# Patient Record
Sex: FEMALE | Race: WHITE | Employment: FULL TIME | URBAN - METROPOLITAN AREA
[De-identification: names, ages, dates, MRNs, and addresses within clinical notes are randomized per-mention and may not be internally consistent; named-entity substitution may affect disease eponyms.]

---

## 2018-08-10 PROBLEM — Z34.02 ENCOUNTER FOR SUPERVISION OF NORMAL FIRST PREGNANCY IN SECOND TRIMESTER: Status: ACTIVE | Noted: 2018-08-10

## 2018-09-06 ENCOUNTER — HOSPITAL ENCOUNTER (OUTPATIENT)
Age: 33
Discharge: HOME | End: 2018-09-06
Attending: OBSTETRICS & GYNECOLOGY | Admitting: OBSTETRICS & GYNECOLOGY
Payer: COMMERCIAL

## 2018-09-06 VITALS
HEART RATE: 62 BPM | WEIGHT: 133 LBS | RESPIRATION RATE: 16 BRPM | HEIGHT: 60 IN | TEMPERATURE: 98.2 F | DIASTOLIC BLOOD PRESSURE: 73 MMHG | BODY MASS INDEX: 26.11 KG/M2 | SYSTOLIC BLOOD PRESSURE: 111 MMHG

## 2018-09-06 PROBLEM — Z3A.20 20 WEEKS GESTATION OF PREGNANCY: Status: ACTIVE | Noted: 2018-09-06

## 2018-09-06 PROCEDURE — 76817 TRANSVAGINAL US OBSTETRIC: CPT | Performed by: OBSTETRICS & GYNECOLOGY

## 2018-09-06 PROCEDURE — 76811 OB US DETAILED SNGL FETUS: CPT

## 2018-09-06 PROCEDURE — 76817 TRANSVAGINAL US OBSTETRIC: CPT

## 2018-09-06 PROCEDURE — 76811 OB US DETAILED SNGL FETUS: CPT | Performed by: OBSTETRICS & GYNECOLOGY

## 2018-09-06 PROCEDURE — 99242 OFF/OP CONSLTJ NEW/EST SF 20: CPT | Performed by: OBSTETRICS & GYNECOLOGY

## 2018-09-06 PROCEDURE — 99211 OFF/OP EST MAY X REQ PHY/QHP: CPT

## 2018-09-06 PROCEDURE — 84112 EVAL AMNIOTIC FLUID PROTEIN: CPT

## 2018-09-06 NOTE — H&P
negative  Respiratory: negative  Cardiovascular: negative  Gastrointestinal: negative  Genitourinary:negative  Integument/breast: negative  Hematologic/lymphatic: negative  Musculoskeletal:negative  Neurological: negative  Behavioral/Psych: negative  Endocrine: negative  Allergic/Immunologic: negative    PHYSICAL EXAM:    General appearance:  awake, alert, cooperative, no apparent distress, and appears stated age  Neurologic:  Awake, alert, oriented to name, place and time. Cranial nerves II-XII are grossly intact. Motor is 5 out of 5 bilaterally. Cerebellar finger to nose, heel to shin intact. Sensory is intact.   Babinski down going, Romberg negative, and gait is normal.  Lungs:  No increased work of breathing, good air exchange, clear to auscultation bilaterally, no crackles or wheezing  Heart:  Normal apical impulse, regular rate and rhythm, normal S1 and S2, no S3 or S4, and no murmur noted  Abdomen:  No scars, normal bowel sounds, soft, non-distended, non-tender, no masses palpated, no hepatosplenomegally  Fetal heart rate:  Baseline Heart Rate 161  Pelvis:  External Genitalia: General appearance; normal, Hair distribution; normal, Lesions absent  Cervix:    DILATION:  Cl cm  EFFACEMENT:   50%  STATION:  -3cm      Contraction frequency:  0 minutes  Membranes:  intact = amnisure neg  Speculum = no pooling      /73   Pulse 62   Temp 98.2 °F (36.8 °C) (Oral)   Resp 16   Ht 5' (1.524 m)   Wt 133 lb (60.3 kg)   LMP 04/17/2018   BMI 25.97 kg/m²     General Labs:    CBC:   Lab Results   Component Value Date    WBC 8.4 07/13/2018    WBC 12.2 02/22/2017    RBC 4.35 07/13/2018    HGB 13.6 07/13/2018    HCT 38.7 07/13/2018    MCV 89.0 07/13/2018    RDW 12.3 07/13/2018     07/13/2018     02/22/2017     CMP:    Lab Results   Component Value Date     02/22/2017    K 4.5 02/22/2017     02/22/2017    CO2 23 02/22/2017    BUN 11 02/22/2017    PROT 7.5 02/21/2017     U/A:  No components

## 2018-09-21 PROBLEM — Z3A.20 20 WEEKS GESTATION OF PREGNANCY: Status: RESOLVED | Noted: 2018-09-06 | Resolved: 2018-09-21

## 2018-11-11 PROBLEM — Z34.02 ENCOUNTER FOR SUPERVISION OF NORMAL FIRST PREGNANCY IN SECOND TRIMESTER: Status: RESOLVED | Noted: 2018-08-10 | Resolved: 2018-11-11

## 2018-11-11 PROBLEM — Z34.03 ENCOUNTER FOR SUPERVISION OF NORMAL FIRST PREGNANCY IN THIRD TRIMESTER: Status: ACTIVE | Noted: 2018-11-11

## 2018-11-11 PROBLEM — O09.513 ELDERLY PRIMIGRAVIDA IN THIRD TRIMESTER: Status: ACTIVE | Noted: 2018-11-11

## 2018-11-11 PROBLEM — O35.EXX0 ENCOUNTER FOR REPEAT ULTRASOUND OF FETAL PYELECTASIS IN SINGLETON PREGNANCY, ANTEPARTUM: Status: ACTIVE | Noted: 2018-11-11

## 2019-01-04 PROBLEM — O35.EXX0 ENCOUNTER FOR REPEAT ULTRASOUND OF FETAL PYELECTASIS IN SINGLETON PREGNANCY, ANTEPARTUM: Status: ACTIVE | Noted: 2019-01-04

## 2019-01-14 ENCOUNTER — HOSPITAL ENCOUNTER (OUTPATIENT)
Age: 34
Discharge: HOME | End: 2019-01-14
Attending: OBSTETRICS & GYNECOLOGY | Admitting: OBSTETRICS & GYNECOLOGY
Payer: COMMERCIAL

## 2019-01-14 VITALS
WEIGHT: 165 LBS | HEIGHT: 60 IN | DIASTOLIC BLOOD PRESSURE: 85 MMHG | BODY MASS INDEX: 32.39 KG/M2 | HEART RATE: 62 BPM | SYSTOLIC BLOOD PRESSURE: 130 MMHG | RESPIRATION RATE: 18 BRPM | TEMPERATURE: 97.7 F

## 2019-01-14 PROBLEM — Z3A.38 38 WEEKS GESTATION OF PREGNANCY: Status: ACTIVE | Noted: 2019-01-14

## 2019-01-14 PROCEDURE — 99212 OFFICE O/P EST SF 10 MIN: CPT

## 2019-01-14 PROCEDURE — 84112 EVAL AMNIOTIC FLUID PROTEIN: CPT

## 2019-01-14 PROCEDURE — 99213 OFFICE O/P EST LOW 20 MIN: CPT | Performed by: OBSTETRICS & GYNECOLOGY

## 2019-01-23 ENCOUNTER — HOSPITAL ENCOUNTER (INPATIENT)
Age: 34
LOS: 3 days | Discharge: HOME | End: 2019-01-26
Attending: OBSTETRICS & GYNECOLOGY | Admitting: OBSTETRICS & GYNECOLOGY
Payer: COMMERCIAL

## 2019-01-23 ENCOUNTER — ANESTHESIA EVENT (OUTPATIENT)
Dept: LABOR AND DELIVERY | Age: 34
End: 2019-01-23
Payer: COMMERCIAL

## 2019-01-23 ENCOUNTER — APPOINTMENT (OUTPATIENT)
Dept: LABOR AND DELIVERY | Age: 34
End: 2019-01-23
Payer: COMMERCIAL

## 2019-01-23 ENCOUNTER — ANESTHESIA (OUTPATIENT)
Dept: LABOR AND DELIVERY | Age: 34
End: 2019-01-23
Payer: COMMERCIAL

## 2019-01-23 DIAGNOSIS — Z3A.40 40 WEEKS GESTATION OF PREGNANCY: Primary | ICD-10-CM

## 2019-01-23 LAB
ABO/RH: NORMAL
AMPHETAMINE SCREEN, URINE: NOT DETECTED
ANTIBODY SCREEN: NORMAL
BARBITURATE SCREEN URINE: NOT DETECTED
BENZODIAZEPINE SCREEN, URINE: NOT DETECTED
CANNABINOID SCREEN URINE: NOT DETECTED
COCAINE METABOLITE SCREEN URINE: NOT DETECTED
HCT VFR BLD CALC: 33.6 % (ref 34–48)
HEMOGLOBIN: 11.5 G/DL (ref 11.5–15.5)
MCH RBC QN AUTO: 30.8 PG (ref 26–35)
MCHC RBC AUTO-ENTMCNC: 34.2 % (ref 32–34.5)
MCV RBC AUTO: 90.1 FL (ref 80–99.9)
METHADONE SCREEN, URINE: NOT DETECTED
OPIATE SCREEN URINE: NOT DETECTED
PDW BLD-RTO: 12.8 FL (ref 11.5–15)
PHENCYCLIDINE SCREEN URINE: NOT DETECTED
PLATELET # BLD: 322 E9/L (ref 130–450)
PMV BLD AUTO: 10.9 FL (ref 7–12)
PROPOXYPHENE SCREEN: NOT DETECTED
RBC # BLD: 3.73 E12/L (ref 3.5–5.5)
WBC # BLD: 13.3 E9/L (ref 4.5–11.5)

## 2019-01-23 PROCEDURE — 1220000001 HC SEMI PRIVATE L&D R&B

## 2019-01-23 PROCEDURE — 86900 BLOOD TYPING SEROLOGIC ABO: CPT

## 2019-01-23 PROCEDURE — 80307 DRUG TEST PRSMV CHEM ANLYZR: CPT

## 2019-01-23 PROCEDURE — 2580000003 HC RX 258: Performed by: OBSTETRICS & GYNECOLOGY

## 2019-01-23 PROCEDURE — 86850 RBC ANTIBODY SCREEN: CPT

## 2019-01-23 PROCEDURE — 85027 COMPLETE CBC AUTOMATED: CPT

## 2019-01-23 PROCEDURE — 86901 BLOOD TYPING SEROLOGIC RH(D): CPT

## 2019-01-23 PROCEDURE — 36415 COLL VENOUS BLD VENIPUNCTURE: CPT

## 2019-01-23 PROCEDURE — 6370000000 HC RX 637 (ALT 250 FOR IP): Performed by: OBSTETRICS & GYNECOLOGY

## 2019-01-23 RX ORDER — SODIUM CHLORIDE 0.9 % (FLUSH) 0.9 %
10 SYRINGE (ML) INJECTION PRN
Status: DISCONTINUED | OUTPATIENT
Start: 2019-01-23 | End: 2019-01-27 | Stop reason: HOSPADM

## 2019-01-23 RX ORDER — DOCUSATE SODIUM 100 MG/1
100 CAPSULE, LIQUID FILLED ORAL 2 TIMES DAILY
Status: DISCONTINUED | OUTPATIENT
Start: 2019-01-23 | End: 2019-01-27 | Stop reason: HOSPADM

## 2019-01-23 RX ORDER — SODIUM CHLORIDE, SODIUM LACTATE, POTASSIUM CHLORIDE, CALCIUM CHLORIDE 600; 310; 30; 20 MG/100ML; MG/100ML; MG/100ML; MG/100ML
INJECTION, SOLUTION INTRAVENOUS CONTINUOUS
Status: DISCONTINUED | OUTPATIENT
Start: 2019-01-23 | End: 2019-01-27 | Stop reason: HOSPADM

## 2019-01-23 RX ORDER — SODIUM CHLORIDE 0.9 % (FLUSH) 0.9 %
10 SYRINGE (ML) INJECTION EVERY 12 HOURS SCHEDULED
Status: DISCONTINUED | OUTPATIENT
Start: 2019-01-23 | End: 2019-01-27 | Stop reason: HOSPADM

## 2019-01-23 RX ORDER — ONDANSETRON 2 MG/ML
4 INJECTION INTRAMUSCULAR; INTRAVENOUS EVERY 6 HOURS PRN
Status: DISCONTINUED | OUTPATIENT
Start: 2019-01-23 | End: 2019-01-24 | Stop reason: HOSPADM

## 2019-01-23 RX ADMIN — SODIUM CHLORIDE, POTASSIUM CHLORIDE, SODIUM LACTATE AND CALCIUM CHLORIDE: 600; 310; 30; 20 INJECTION, SOLUTION INTRAVENOUS at 21:05

## 2019-01-23 RX ADMIN — DINOPROSTONE 10 MG: 10 INSERT VAGINAL at 21:30

## 2019-01-24 VITALS — SYSTOLIC BLOOD PRESSURE: 109 MMHG | OXYGEN SATURATION: 96 % | DIASTOLIC BLOOD PRESSURE: 51 MMHG

## 2019-01-24 PROCEDURE — 2500000003 HC RX 250 WO HCPCS: Performed by: NURSE ANESTHETIST, CERTIFIED REGISTERED

## 2019-01-24 PROCEDURE — 6360000002 HC RX W HCPCS: Performed by: OBSTETRICS & GYNECOLOGY

## 2019-01-24 PROCEDURE — 2580000003 HC RX 258: Performed by: OBSTETRICS & GYNECOLOGY

## 2019-01-24 PROCEDURE — 1220000000 HC SEMI PRIVATE OB R&B

## 2019-01-24 PROCEDURE — 2580000003 HC RX 258: Performed by: NURSE ANESTHETIST, CERTIFIED REGISTERED

## 2019-01-24 PROCEDURE — 3609079900 HC CESAREAN SECTION: Performed by: OBSTETRICS & GYNECOLOGY

## 2019-01-24 PROCEDURE — 6370000000 HC RX 637 (ALT 250 FOR IP): Performed by: OBSTETRICS & GYNECOLOGY

## 2019-01-24 PROCEDURE — 3700000000 HC ANESTHESIA ATTENDED CARE: Performed by: OBSTETRICS & GYNECOLOGY

## 2019-01-24 PROCEDURE — 6360000002 HC RX W HCPCS

## 2019-01-24 PROCEDURE — 3700000001 HC ADD 15 MINUTES (ANESTHESIA): Performed by: OBSTETRICS & GYNECOLOGY

## 2019-01-24 PROCEDURE — 6360000002 HC RX W HCPCS: Performed by: NURSE ANESTHETIST, CERTIFIED REGISTERED

## 2019-01-24 PROCEDURE — 2709999900 HC NON-CHARGEABLE SUPPLY: Performed by: OBSTETRICS & GYNECOLOGY

## 2019-01-24 PROCEDURE — 2500000003 HC RX 250 WO HCPCS: Performed by: ANESTHESIOLOGY

## 2019-01-24 PROCEDURE — 6360000002 HC RX W HCPCS: Performed by: ANESTHESIOLOGY

## 2019-01-24 PROCEDURE — 2500000003 HC RX 250 WO HCPCS

## 2019-01-24 PROCEDURE — 7100000000 HC PACU RECOVERY - FIRST 15 MIN: Performed by: OBSTETRICS & GYNECOLOGY

## 2019-01-24 PROCEDURE — 3700000025 ANESTHESIA EPIDURAL BLOCK: Performed by: ANESTHESIOLOGY

## 2019-01-24 PROCEDURE — 51701 INSERT BLADDER CATHETER: CPT

## 2019-01-24 PROCEDURE — 7100000001 HC PACU RECOVERY - ADDTL 15 MIN: Performed by: OBSTETRICS & GYNECOLOGY

## 2019-01-24 RX ORDER — OXYCODONE HYDROCHLORIDE AND ACETAMINOPHEN 5; 325 MG/1; MG/1
2 TABLET ORAL EVERY 4 HOURS PRN
Status: DISCONTINUED | OUTPATIENT
Start: 2019-01-24 | End: 2019-01-27 | Stop reason: HOSPADM

## 2019-01-24 RX ORDER — KETOROLAC TROMETHAMINE 30 MG/ML
30 INJECTION, SOLUTION INTRAMUSCULAR; INTRAVENOUS EVERY 6 HOURS
Status: DISPENSED | OUTPATIENT
Start: 2019-01-24 | End: 2019-01-26

## 2019-01-24 RX ORDER — CEFAZOLIN SODIUM 2 G/50ML
2 SOLUTION INTRAVENOUS ONCE
Status: COMPLETED | OUTPATIENT
Start: 2019-01-24 | End: 2019-01-24

## 2019-01-24 RX ORDER — LIDOCAINE HYDROCHLORIDE 10 MG/ML
INJECTION, SOLUTION INFILTRATION; PERINEURAL
Status: DISCONTINUED
Start: 2019-01-24 | End: 2019-01-24 | Stop reason: WASHOUT

## 2019-01-24 RX ORDER — LANOLIN 100 %
OINTMENT (GRAM) TOPICAL
Status: DISCONTINUED | OUTPATIENT
Start: 2019-01-24 | End: 2019-01-27 | Stop reason: HOSPADM

## 2019-01-24 RX ORDER — SODIUM CHLORIDE, SODIUM LACTATE, POTASSIUM CHLORIDE, CALCIUM CHLORIDE 600; 310; 30; 20 MG/100ML; MG/100ML; MG/100ML; MG/100ML
INJECTION, SOLUTION INTRAVENOUS CONTINUOUS PRN
Status: DISCONTINUED | OUTPATIENT
Start: 2019-01-24 | End: 2019-01-24 | Stop reason: SDUPTHER

## 2019-01-24 RX ORDER — BISACODYL 10 MG
10 SUPPOSITORY, RECTAL RECTAL PRN
Status: DISCONTINUED | OUTPATIENT
Start: 2019-01-24 | End: 2019-01-27 | Stop reason: HOSPADM

## 2019-01-24 RX ORDER — SODIUM CHLORIDE 0.9 % (FLUSH) 0.9 %
10 SYRINGE (ML) INJECTION EVERY 12 HOURS SCHEDULED
Status: DISCONTINUED | OUTPATIENT
Start: 2019-01-24 | End: 2019-01-27 | Stop reason: HOSPADM

## 2019-01-24 RX ORDER — KETOROLAC TROMETHAMINE 30 MG/ML
15 INJECTION, SOLUTION INTRAMUSCULAR; INTRAVENOUS EVERY 6 HOURS
Status: COMPLETED | OUTPATIENT
Start: 2019-01-24 | End: 2019-01-25

## 2019-01-24 RX ORDER — MORPHINE SULFATE 1 MG/ML
INJECTION, SOLUTION EPIDURAL; INTRATHECAL; INTRAVENOUS PRN
Status: DISCONTINUED | OUTPATIENT
Start: 2019-01-24 | End: 2019-01-24 | Stop reason: SDUPTHER

## 2019-01-24 RX ORDER — ONDANSETRON 2 MG/ML
4 INJECTION INTRAMUSCULAR; INTRAVENOUS EVERY 6 HOURS PRN
Status: DISCONTINUED | OUTPATIENT
Start: 2019-01-24 | End: 2019-01-24 | Stop reason: HOSPADM

## 2019-01-24 RX ORDER — LIDOCAINE HYDROCHLORIDE AND EPINEPHRINE 20; 5 MG/ML; UG/ML
INJECTION, SOLUTION EPIDURAL; INFILTRATION; INTRACAUDAL; PERINEURAL PRN
Status: DISCONTINUED | OUTPATIENT
Start: 2019-01-24 | End: 2019-01-24 | Stop reason: SDUPTHER

## 2019-01-24 RX ORDER — NALOXONE HYDROCHLORIDE 0.4 MG/ML
0.4 INJECTION, SOLUTION INTRAMUSCULAR; INTRAVENOUS; SUBCUTANEOUS PRN
Status: DISCONTINUED | OUTPATIENT
Start: 2019-01-24 | End: 2019-01-24 | Stop reason: HOSPADM

## 2019-01-24 RX ORDER — ACETAMINOPHEN 650 MG
TABLET, EXTENDED RELEASE ORAL
Status: DISCONTINUED
Start: 2019-01-24 | End: 2019-01-24 | Stop reason: WASHOUT

## 2019-01-24 RX ORDER — TRISODIUM CITRATE DIHYDRATE AND CITRIC ACID MONOHYDRATE 500; 334 MG/5ML; MG/5ML
SOLUTION ORAL
Status: DISCONTINUED
Start: 2019-01-24 | End: 2019-01-24

## 2019-01-24 RX ORDER — EPHEDRINE SULFATE 50 MG/ML
10 INJECTION, SOLUTION INTRAVENOUS PRN
Status: DISCONTINUED | OUTPATIENT
Start: 2019-01-24 | End: 2019-01-24

## 2019-01-24 RX ORDER — TRISODIUM CITRATE DIHYDRATE AND CITRIC ACID MONOHYDRATE 500; 334 MG/5ML; MG/5ML
30 SOLUTION ORAL ONCE
Status: COMPLETED | OUTPATIENT
Start: 2019-01-24 | End: 2019-01-24

## 2019-01-24 RX ORDER — IBUPROFEN 800 MG/1
800 TABLET ORAL EVERY 8 HOURS
Status: DISCONTINUED | OUTPATIENT
Start: 2019-01-24 | End: 2019-01-27 | Stop reason: HOSPADM

## 2019-01-24 RX ORDER — DOCUSATE SODIUM 100 MG/1
100 CAPSULE, LIQUID FILLED ORAL 2 TIMES DAILY
Status: DISCONTINUED | OUTPATIENT
Start: 2019-01-24 | End: 2019-01-27 | Stop reason: HOSPADM

## 2019-01-24 RX ORDER — EPHEDRINE SULFATE 50 MG/ML
INJECTION INTRAVENOUS
Status: COMPLETED
Start: 2019-01-24 | End: 2019-01-24

## 2019-01-24 RX ORDER — OXYCODONE HYDROCHLORIDE AND ACETAMINOPHEN 5; 325 MG/1; MG/1
1 TABLET ORAL EVERY 4 HOURS PRN
Status: DISCONTINUED | OUTPATIENT
Start: 2019-01-24 | End: 2019-01-27 | Stop reason: HOSPADM

## 2019-01-24 RX ORDER — PROMETHAZINE HYDROCHLORIDE 25 MG/ML
INJECTION, SOLUTION INTRAMUSCULAR; INTRAVENOUS PRN
Status: DISCONTINUED | OUTPATIENT
Start: 2019-01-24 | End: 2019-01-24 | Stop reason: SDUPTHER

## 2019-01-24 RX ORDER — SODIUM CHLORIDE, SODIUM LACTATE, POTASSIUM CHLORIDE, CALCIUM CHLORIDE 600; 310; 30; 20 MG/100ML; MG/100ML; MG/100ML; MG/100ML
INJECTION, SOLUTION INTRAVENOUS CONTINUOUS
Status: DISCONTINUED | OUTPATIENT
Start: 2019-01-24 | End: 2019-01-27 | Stop reason: HOSPADM

## 2019-01-24 RX ORDER — SODIUM CHLORIDE 0.9 % (FLUSH) 0.9 %
10 SYRINGE (ML) INJECTION PRN
Status: DISCONTINUED | OUTPATIENT
Start: 2019-01-24 | End: 2019-01-27 | Stop reason: HOSPADM

## 2019-01-24 RX ORDER — DIPHENHYDRAMINE HYDROCHLORIDE 50 MG/ML
25 INJECTION INTRAMUSCULAR; INTRAVENOUS EVERY 6 HOURS PRN
Status: DISCONTINUED | OUTPATIENT
Start: 2019-01-24 | End: 2019-01-27 | Stop reason: HOSPADM

## 2019-01-24 RX ORDER — ONDANSETRON 2 MG/ML
4 INJECTION INTRAMUSCULAR; INTRAVENOUS EVERY 6 HOURS PRN
Status: DISCONTINUED | OUTPATIENT
Start: 2019-01-24 | End: 2019-01-27 | Stop reason: HOSPADM

## 2019-01-24 RX ORDER — ACETAMINOPHEN 325 MG/1
650 TABLET ORAL EVERY 4 HOURS PRN
Status: DISCONTINUED | OUTPATIENT
Start: 2019-01-24 | End: 2019-01-27 | Stop reason: HOSPADM

## 2019-01-24 RX ORDER — SIMETHICONE 80 MG
80 TABLET,CHEWABLE ORAL 4 TIMES DAILY
Status: DISCONTINUED | OUTPATIENT
Start: 2019-01-24 | End: 2019-01-27 | Stop reason: HOSPADM

## 2019-01-24 RX ORDER — CEFAZOLIN SODIUM 2 G/50ML
SOLUTION INTRAVENOUS
Status: COMPLETED
Start: 2019-01-24 | End: 2019-01-24

## 2019-01-24 RX ORDER — NALBUPHINE HCL 10 MG/ML
5 AMPUL (ML) INJECTION EVERY 4 HOURS PRN
Status: DISCONTINUED | OUTPATIENT
Start: 2019-01-24 | End: 2019-01-24 | Stop reason: HOSPADM

## 2019-01-24 RX ORDER — NALOXONE HYDROCHLORIDE 0.4 MG/ML
0.4 INJECTION, SOLUTION INTRAMUSCULAR; INTRAVENOUS; SUBCUTANEOUS PRN
Status: DISCONTINUED | OUTPATIENT
Start: 2019-01-24 | End: 2019-01-27 | Stop reason: HOSPADM

## 2019-01-24 RX ORDER — FERROUS SULFATE 325(65) MG
325 TABLET ORAL 2 TIMES DAILY WITH MEALS
Status: DISCONTINUED | OUTPATIENT
Start: 2019-01-25 | End: 2019-01-27 | Stop reason: HOSPADM

## 2019-01-24 RX ORDER — FENTANYL CITRATE 50 UG/ML
INJECTION, SOLUTION INTRAMUSCULAR; INTRAVENOUS PRN
Status: DISCONTINUED | OUTPATIENT
Start: 2019-01-24 | End: 2019-01-24 | Stop reason: SDUPTHER

## 2019-01-24 RX ORDER — PRENATAL WITH FERROUS FUM AND FOLIC ACID 3080; 920; 120; 400; 22; 1.84; 3; 20; 10; 1; 12; 200; 27; 25; 2 [IU]/1; [IU]/1; MG/1; [IU]/1; MG/1; MG/1; MG/1; MG/1; MG/1; MG/1; UG/1; MG/1; MG/1; MG/1; MG/1
1 TABLET ORAL DAILY
Status: DISCONTINUED | OUTPATIENT
Start: 2019-01-25 | End: 2019-01-27 | Stop reason: HOSPADM

## 2019-01-24 RX ORDER — NALBUPHINE HCL 10 MG/ML
5 AMPUL (ML) INJECTION EVERY 4 HOURS PRN
Status: DISCONTINUED | OUTPATIENT
Start: 2019-01-24 | End: 2019-01-27 | Stop reason: HOSPADM

## 2019-01-24 RX ADMIN — PHENYLEPHRINE HYDROCHLORIDE 100 MCG: 10 INJECTION INTRAVENOUS at 18:07

## 2019-01-24 RX ADMIN — Medication 15 ML/HR: at 04:08

## 2019-01-24 RX ADMIN — SODIUM CHLORIDE, POTASSIUM CHLORIDE, SODIUM LACTATE AND CALCIUM CHLORIDE: 600; 310; 30; 20 INJECTION, SOLUTION INTRAVENOUS at 23:41

## 2019-01-24 RX ADMIN — FENTANYL CITRATE 66.66 MCG: 50 INJECTION, SOLUTION INTRAMUSCULAR; INTRAVENOUS at 18:00

## 2019-01-24 RX ADMIN — SODIUM CHLORIDE, POTASSIUM CHLORIDE, SODIUM LACTATE AND CALCIUM CHLORIDE: 600; 310; 30; 20 INJECTION, SOLUTION INTRAVENOUS at 23:06

## 2019-01-24 RX ADMIN — PHENYLEPHRINE HYDROCHLORIDE 200 MCG: 10 INJECTION INTRAVENOUS at 18:10

## 2019-01-24 RX ADMIN — Medication 1 MILLI-UNITS/MIN: at 02:41

## 2019-01-24 RX ADMIN — KETOROLAC TROMETHAMINE 15 MG: 30 INJECTION, SOLUTION INTRAMUSCULAR; INTRAVENOUS at 22:28

## 2019-01-24 RX ADMIN — SODIUM CHLORIDE, POTASSIUM CHLORIDE, SODIUM LACTATE AND CALCIUM CHLORIDE: 600; 310; 30; 20 INJECTION, SOLUTION INTRAVENOUS at 18:03

## 2019-01-24 RX ADMIN — CEFAZOLIN SODIUM 2 G: 2 SOLUTION INTRAVENOUS at 17:42

## 2019-01-24 RX ADMIN — DOCUSATE SODIUM 100 MG: 100 CAPSULE, LIQUID FILLED ORAL at 22:28

## 2019-01-24 RX ADMIN — Medication 999 ML/HR: at 18:15

## 2019-01-24 RX ADMIN — PHENYLEPHRINE HYDROCHLORIDE 200 MCG: 10 INJECTION INTRAVENOUS at 18:55

## 2019-01-24 RX ADMIN — Medication 7 ML: at 04:01

## 2019-01-24 RX ADMIN — Medication 15 ML/HR: at 14:36

## 2019-01-24 RX ADMIN — PROMETHAZINE HYDROCHLORIDE 10 MG: 25 INJECTION INTRAMUSCULAR; INTRAVENOUS at 18:46

## 2019-01-24 RX ADMIN — ONDANSETRON 4 MG: 2 INJECTION INTRAMUSCULAR; INTRAVENOUS at 07:57

## 2019-01-24 RX ADMIN — LIDOCAINE HYDROCHLORIDE,EPINEPHRINE BITARTRATE 10 ML: 20; .005 INJECTION, SOLUTION EPIDURAL; INFILTRATION; INTRACAUDAL; PERINEURAL at 18:00

## 2019-01-24 RX ADMIN — SODIUM CITRATE AND CITRIC ACID MONOHYDRATE 30 ML: 500; 334 SOLUTION ORAL at 17:44

## 2019-01-24 RX ADMIN — PHENYLEPHRINE HYDROCHLORIDE 200 MCG: 10 INJECTION INTRAVENOUS at 18:39

## 2019-01-24 RX ADMIN — ONDANSETRON 4 MG: 2 INJECTION INTRAMUSCULAR; INTRAVENOUS at 16:03

## 2019-01-24 RX ADMIN — SODIUM CHLORIDE, POTASSIUM CHLORIDE, SODIUM LACTATE AND CALCIUM CHLORIDE: 600; 310; 30; 20 INJECTION, SOLUTION INTRAVENOUS at 17:41

## 2019-01-24 RX ADMIN — MORPHINE SULFATE 2 MG: 1 INJECTION EPIDURAL; INTRATHECAL; INTRAVENOUS at 18:23

## 2019-01-24 RX ADMIN — EPHEDRINE SULFATE 5 MG: 50 INJECTION, SOLUTION INTRAVENOUS at 05:08

## 2019-01-24 RX ADMIN — PHENYLEPHRINE HYDROCHLORIDE 100 MCG: 10 INJECTION INTRAVENOUS at 04:05

## 2019-01-24 ASSESSMENT — PULMONARY FUNCTION TESTS
PIF_VALUE: 0

## 2019-01-24 ASSESSMENT — PAIN SCALES - GENERAL: PAINLEVEL_OUTOF10: 5

## 2019-01-25 LAB
HCT VFR BLD CALC: 31.6 % (ref 34–48)
HEMOGLOBIN: 10.6 G/DL (ref 11.5–15.5)

## 2019-01-25 PROCEDURE — 6370000000 HC RX 637 (ALT 250 FOR IP): Performed by: OBSTETRICS & GYNECOLOGY

## 2019-01-25 PROCEDURE — 2580000003 HC RX 258: Performed by: OBSTETRICS & GYNECOLOGY

## 2019-01-25 PROCEDURE — 85018 HEMOGLOBIN: CPT

## 2019-01-25 PROCEDURE — 85014 HEMATOCRIT: CPT

## 2019-01-25 PROCEDURE — 6360000002 HC RX W HCPCS: Performed by: ANESTHESIOLOGY

## 2019-01-25 PROCEDURE — 36415 COLL VENOUS BLD VENIPUNCTURE: CPT

## 2019-01-25 PROCEDURE — 6360000002 HC RX W HCPCS: Performed by: OBSTETRICS & GYNECOLOGY

## 2019-01-25 PROCEDURE — 1220000000 HC SEMI PRIVATE OB R&B

## 2019-01-25 RX ORDER — IBUPROFEN 800 MG/1
800 TABLET ORAL EVERY 8 HOURS PRN
Qty: 30 TABLET | Refills: 0 | Status: ON HOLD | OUTPATIENT
Start: 2019-01-25 | End: 2021-07-20 | Stop reason: HOSPADM

## 2019-01-25 RX ORDER — OXYCODONE HYDROCHLORIDE AND ACETAMINOPHEN 5; 325 MG/1; MG/1
1 TABLET ORAL EVERY 6 HOURS PRN
Qty: 28 TABLET | Refills: 0 | Status: SHIPPED | OUTPATIENT
Start: 2019-01-25 | End: 2019-01-30

## 2019-01-25 RX ADMIN — SIMETHICONE 80 MG: 80 TABLET, CHEWABLE ORAL at 18:01

## 2019-01-25 RX ADMIN — KETOROLAC TROMETHAMINE 15 MG: 30 INJECTION, SOLUTION INTRAMUSCULAR; INTRAVENOUS at 04:35

## 2019-01-25 RX ADMIN — DOCUSATE SODIUM 100 MG: 100 CAPSULE, LIQUID FILLED ORAL at 20:55

## 2019-01-25 RX ADMIN — Medication 1 TABLET: at 11:24

## 2019-01-25 RX ADMIN — DOCUSATE SODIUM 100 MG: 100 CAPSULE, LIQUID FILLED ORAL at 11:24

## 2019-01-25 RX ADMIN — Medication 10 ML: at 11:25

## 2019-01-25 RX ADMIN — SIMETHICONE 80 MG: 80 TABLET, CHEWABLE ORAL at 11:24

## 2019-01-25 RX ADMIN — Medication 10 ML: at 18:01

## 2019-01-25 RX ADMIN — KETOROLAC TROMETHAMINE 15 MG: 30 INJECTION, SOLUTION INTRAMUSCULAR; INTRAVENOUS at 11:25

## 2019-01-25 RX ADMIN — SIMETHICONE 80 MG: 80 TABLET, CHEWABLE ORAL at 21:51

## 2019-01-25 RX ADMIN — KETOROLAC TROMETHAMINE 30 MG: 30 INJECTION, SOLUTION INTRAMUSCULAR; INTRAVENOUS at 18:01

## 2019-01-25 ASSESSMENT — PAIN DESCRIPTION - PROGRESSION
CLINICAL_PROGRESSION: RESOLVED
CLINICAL_PROGRESSION: GRADUALLY WORSENING
CLINICAL_PROGRESSION: GRADUALLY WORSENING
CLINICAL_PROGRESSION: RESOLVED

## 2019-01-25 ASSESSMENT — PAIN DESCRIPTION - RADICULAR PAIN
RADICULAR_PAIN: ABSENT
RADICULAR_PAIN: ABSENT

## 2019-01-25 ASSESSMENT — PAIN DESCRIPTION - LOCATION
LOCATION: INCISION
LOCATION: INCISION

## 2019-01-25 ASSESSMENT — PAIN DESCRIPTION - DESCRIPTORS
DESCRIPTORS: DISCOMFORT
DESCRIPTORS: DISCOMFORT

## 2019-01-25 ASSESSMENT — PAIN SCALES - GENERAL
PAINLEVEL_OUTOF10: 0
PAINLEVEL_OUTOF10: 3
PAINLEVEL_OUTOF10: 3
PAINLEVEL_OUTOF10: 0
PAINLEVEL_OUTOF10: 3

## 2019-01-25 ASSESSMENT — PAIN DESCRIPTION - PAIN TYPE
TYPE: SURGICAL PAIN
TYPE: SURGICAL PAIN

## 2019-01-25 ASSESSMENT — PAIN - FUNCTIONAL ASSESSMENT
PAIN_FUNCTIONAL_ASSESSMENT: ACTIVITIES ARE NOT PREVENTED
PAIN_FUNCTIONAL_ASSESSMENT: ACTIVITIES ARE NOT PREVENTED

## 2019-01-25 ASSESSMENT — PAIN DESCRIPTION - ORIENTATION
ORIENTATION: LOWER
ORIENTATION: LOWER

## 2019-01-25 ASSESSMENT — PAIN DESCRIPTION - FREQUENCY
FREQUENCY: INTERMITTENT
FREQUENCY: INTERMITTENT

## 2019-01-26 VITALS
SYSTOLIC BLOOD PRESSURE: 134 MMHG | OXYGEN SATURATION: 98 % | HEIGHT: 60 IN | HEART RATE: 67 BPM | DIASTOLIC BLOOD PRESSURE: 84 MMHG | RESPIRATION RATE: 18 BRPM | WEIGHT: 173 LBS | TEMPERATURE: 98.8 F | BODY MASS INDEX: 33.96 KG/M2

## 2019-01-26 PROCEDURE — 6370000000 HC RX 637 (ALT 250 FOR IP): Performed by: OBSTETRICS & GYNECOLOGY

## 2019-01-26 RX ADMIN — Medication 1 TABLET: at 08:57

## 2019-01-26 RX ADMIN — DOCUSATE SODIUM 100 MG: 100 CAPSULE, LIQUID FILLED ORAL at 08:57

## 2019-01-26 RX ADMIN — IBUPROFEN 800 MG: 800 TABLET ORAL at 00:08

## 2019-01-26 RX ADMIN — SIMETHICONE 80 MG: 80 TABLET, CHEWABLE ORAL at 08:57

## 2019-01-26 RX ADMIN — BISACODYL 10 MG: 10 SUPPOSITORY RECTAL at 14:21

## 2019-01-26 RX ADMIN — SIMETHICONE 80 MG: 80 TABLET, CHEWABLE ORAL at 14:21

## 2019-01-26 RX ADMIN — IBUPROFEN 800 MG: 800 TABLET ORAL at 08:57

## 2019-01-26 ASSESSMENT — PAIN - FUNCTIONAL ASSESSMENT: PAIN_FUNCTIONAL_ASSESSMENT: ACTIVITIES ARE NOT PREVENTED

## 2019-01-26 ASSESSMENT — PAIN DESCRIPTION - PAIN TYPE: TYPE: SURGICAL PAIN

## 2019-01-26 ASSESSMENT — PAIN DESCRIPTION - FREQUENCY: FREQUENCY: INTERMITTENT

## 2019-01-26 ASSESSMENT — PAIN DESCRIPTION - LOCATION: LOCATION: ABDOMEN;INCISION

## 2019-01-26 ASSESSMENT — PAIN SCALES - GENERAL
PAINLEVEL_OUTOF10: 4
PAINLEVEL_OUTOF10: 4

## 2019-01-26 ASSESSMENT — PAIN DESCRIPTION - DESCRIPTORS: DESCRIPTORS: ACHING;DISCOMFORT;SORE

## 2019-01-26 ASSESSMENT — PAIN DESCRIPTION - ORIENTATION: ORIENTATION: LOWER

## 2020-03-25 PROBLEM — O35.EXX0 ENCOUNTER FOR REPEAT ULTRASOUND OF FETAL PYELECTASIS IN SINGLETON PREGNANCY, ANTEPARTUM: Status: RESOLVED | Noted: 2018-11-11 | Resolved: 2020-03-24

## 2020-12-21 PROBLEM — N92.6 IRREGULAR MENSES: Status: ACTIVE | Noted: 2020-12-21

## 2020-12-21 PROBLEM — R30.0 DYSURIA: Status: ACTIVE | Noted: 2020-12-21

## 2020-12-21 PROBLEM — N76.1 CHRONIC VAGINITIS: Status: ACTIVE | Noted: 2020-12-21

## 2021-01-18 PROBLEM — Z3A.38 38 WEEKS GESTATION OF PREGNANCY: Status: RESOLVED | Noted: 2019-01-14 | Resolved: 2021-01-18

## 2021-01-18 PROBLEM — Z3A.40 40 WEEKS GESTATION OF PREGNANCY: Status: RESOLVED | Noted: 2019-01-23 | Resolved: 2021-01-18

## 2021-01-18 PROBLEM — N92.6 IRREGULAR MENSES: Status: RESOLVED | Noted: 2020-12-21 | Resolved: 2021-01-18

## 2021-01-18 PROBLEM — O09.513 ELDERLY PRIMIGRAVIDA IN THIRD TRIMESTER: Status: RESOLVED | Noted: 2018-11-11 | Resolved: 2021-01-18

## 2021-01-18 PROBLEM — O09.521 ADVANCED MATERNAL AGE IN MULTIGRAVIDA, FIRST TRIMESTER: Status: ACTIVE | Noted: 2021-01-18

## 2021-01-18 PROBLEM — O34.219 HISTORY OF CESAREAN DELIVERY, CURRENTLY PREGNANT: Status: ACTIVE | Noted: 2021-01-18

## 2021-01-18 PROBLEM — Z34.03 ENCOUNTER FOR SUPERVISION OF NORMAL FIRST PREGNANCY IN THIRD TRIMESTER: Status: RESOLVED | Noted: 2018-11-11 | Resolved: 2021-01-18

## 2021-01-18 PROBLEM — O35.EXX0 ENCOUNTER FOR REPEAT ULTRASOUND OF FETAL PYELECTASIS IN SINGLETON PREGNANCY, ANTEPARTUM: Status: RESOLVED | Noted: 2019-01-04 | Resolved: 2021-01-18

## 2021-04-26 PROBLEM — Z86.16 PERSONAL HISTORY OF COVID-19: Status: ACTIVE | Noted: 2021-04-26

## 2021-07-18 ENCOUNTER — ANESTHESIA EVENT (OUTPATIENT)
Dept: LABOR AND DELIVERY | Age: 36
End: 2021-07-18
Payer: COMMERCIAL

## 2021-07-19 ENCOUNTER — HOSPITAL ENCOUNTER (INPATIENT)
Age: 36
LOS: 2 days | Discharge: HOME | End: 2021-07-21
Attending: OBSTETRICS & GYNECOLOGY | Admitting: OBSTETRICS & GYNECOLOGY
Payer: COMMERCIAL

## 2021-07-19 ENCOUNTER — ANESTHESIA (OUTPATIENT)
Dept: LABOR AND DELIVERY | Age: 36
End: 2021-07-19
Payer: COMMERCIAL

## 2021-07-19 VITALS — SYSTOLIC BLOOD PRESSURE: 99 MMHG | DIASTOLIC BLOOD PRESSURE: 55 MMHG | OXYGEN SATURATION: 94 %

## 2021-07-19 DIAGNOSIS — G89.18 ACUTE POSTOPERATIVE PAIN: Primary | ICD-10-CM

## 2021-07-19 PROBLEM — Z3A.38 38 WEEKS GESTATION OF PREGNANCY: Status: ACTIVE | Noted: 2021-07-19

## 2021-07-19 LAB
ABO/RH: NORMAL
AMPHETAMINE SCREEN, URINE: NOT DETECTED
ANTIBODY SCREEN: NORMAL
BARBITURATE SCREEN URINE: NOT DETECTED
BENZODIAZEPINE SCREEN, URINE: NOT DETECTED
CANNABINOID SCREEN URINE: NOT DETECTED
COCAINE METABOLITE SCREEN URINE: NOT DETECTED
FENTANYL SCREEN, URINE: NOT DETECTED
HCT VFR BLD CALC: 34.3 % (ref 34–48)
HEMOGLOBIN: 11.3 G/DL (ref 11.5–15.5)
Lab: NORMAL
MCH RBC QN AUTO: 29.4 PG (ref 26–35)
MCHC RBC AUTO-ENTMCNC: 32.9 % (ref 32–34.5)
MCV RBC AUTO: 89.3 FL (ref 80–99.9)
METHADONE SCREEN, URINE: NOT DETECTED
OPIATE SCREEN URINE: NOT DETECTED
OXYCODONE URINE: NOT DETECTED
PDW BLD-RTO: 13.2 FL (ref 11.5–15)
PHENCYCLIDINE SCREEN URINE: NOT DETECTED
PLATELET # BLD: 314 E9/L (ref 130–450)
PMV BLD AUTO: 10.2 FL (ref 7–12)
RBC # BLD: 3.84 E12/L (ref 3.5–5.5)
WBC # BLD: 10 E9/L (ref 4.5–11.5)

## 2021-07-19 PROCEDURE — 6370000000 HC RX 637 (ALT 250 FOR IP): Performed by: OBSTETRICS & GYNECOLOGY

## 2021-07-19 PROCEDURE — 3609079900 HC CESAREAN SECTION: Performed by: OBSTETRICS & GYNECOLOGY

## 2021-07-19 PROCEDURE — 36415 COLL VENOUS BLD VENIPUNCTURE: CPT

## 2021-07-19 PROCEDURE — 86901 BLOOD TYPING SEROLOGIC RH(D): CPT

## 2021-07-19 PROCEDURE — 7100000001 HC PACU RECOVERY - ADDTL 15 MIN: Performed by: OBSTETRICS & GYNECOLOGY

## 2021-07-19 PROCEDURE — 6360000002 HC RX W HCPCS: Performed by: OBSTETRICS & GYNECOLOGY

## 2021-07-19 PROCEDURE — 80307 DRUG TEST PRSMV CHEM ANLYZR: CPT

## 2021-07-19 PROCEDURE — 6370000000 HC RX 637 (ALT 250 FOR IP): Performed by: ANESTHESIOLOGY

## 2021-07-19 PROCEDURE — 6360000002 HC RX W HCPCS: Performed by: ANESTHESIOLOGY

## 2021-07-19 PROCEDURE — 7100000000 HC PACU RECOVERY - FIRST 15 MIN: Performed by: OBSTETRICS & GYNECOLOGY

## 2021-07-19 PROCEDURE — 2709999900 HC NON-CHARGEABLE SUPPLY: Performed by: OBSTETRICS & GYNECOLOGY

## 2021-07-19 PROCEDURE — 85027 COMPLETE CBC AUTOMATED: CPT

## 2021-07-19 PROCEDURE — 59514 CESAREAN DELIVERY ONLY: CPT | Performed by: OBSTETRICS & GYNECOLOGY

## 2021-07-19 PROCEDURE — 86900 BLOOD TYPING SEROLOGIC ABO: CPT

## 2021-07-19 PROCEDURE — 2580000003 HC RX 258: Performed by: OBSTETRICS & GYNECOLOGY

## 2021-07-19 PROCEDURE — 2500000003 HC RX 250 WO HCPCS

## 2021-07-19 PROCEDURE — 3700000000 HC ANESTHESIA ATTENDED CARE: Performed by: OBSTETRICS & GYNECOLOGY

## 2021-07-19 PROCEDURE — 3700000001 HC ADD 15 MINUTES (ANESTHESIA): Performed by: OBSTETRICS & GYNECOLOGY

## 2021-07-19 PROCEDURE — 6360000002 HC RX W HCPCS

## 2021-07-19 PROCEDURE — 1220000000 HC SEMI PRIVATE OB R&B

## 2021-07-19 PROCEDURE — 86850 RBC ANTIBODY SCREEN: CPT

## 2021-07-19 PROCEDURE — 88307 TISSUE EXAM BY PATHOLOGIST: CPT

## 2021-07-19 RX ORDER — FOLIC ACID 1 MG/1
1 TABLET ORAL DAILY
Status: DISCONTINUED | OUTPATIENT
Start: 2021-07-19 | End: 2021-07-21 | Stop reason: HOSPADM

## 2021-07-19 RX ORDER — ONDANSETRON 2 MG/ML
INJECTION INTRAMUSCULAR; INTRAVENOUS PRN
Status: DISCONTINUED | OUTPATIENT
Start: 2021-07-19 | End: 2021-07-19 | Stop reason: SDUPTHER

## 2021-07-19 RX ORDER — PRENATAL WITH FERROUS FUM AND FOLIC ACID 3080; 920; 120; 400; 22; 1.84; 3; 20; 10; 1; 12; 200; 27; 25; 2 [IU]/1; [IU]/1; MG/1; [IU]/1; MG/1; MG/1; MG/1; MG/1; MG/1; MG/1; UG/1; MG/1; MG/1; MG/1; MG/1
1 TABLET ORAL DAILY
Status: DISCONTINUED | OUTPATIENT
Start: 2021-07-19 | End: 2021-07-21 | Stop reason: HOSPADM

## 2021-07-19 RX ORDER — IBUPROFEN 800 MG/1
800 TABLET ORAL EVERY 8 HOURS
Status: DISCONTINUED | OUTPATIENT
Start: 2021-07-20 | End: 2021-07-21 | Stop reason: HOSPADM

## 2021-07-19 RX ORDER — FERROUS SULFATE 325(65) MG
325 TABLET ORAL 2 TIMES DAILY WITH MEALS
Status: DISCONTINUED | OUTPATIENT
Start: 2021-07-19 | End: 2021-07-21 | Stop reason: HOSPADM

## 2021-07-19 RX ORDER — KETOROLAC TROMETHAMINE 30 MG/ML
30 INJECTION, SOLUTION INTRAMUSCULAR; INTRAVENOUS EVERY 6 HOURS
Status: DISPENSED | OUTPATIENT
Start: 2021-07-19 | End: 2021-07-20

## 2021-07-19 RX ORDER — SIMETHICONE 80 MG
80 TABLET,CHEWABLE ORAL 4 TIMES DAILY
Status: DISCONTINUED | OUTPATIENT
Start: 2021-07-19 | End: 2021-07-21 | Stop reason: HOSPADM

## 2021-07-19 RX ORDER — DIPHENHYDRAMINE HYDROCHLORIDE 50 MG/ML
12.5 INJECTION INTRAMUSCULAR; INTRAVENOUS
Status: DISCONTINUED | OUTPATIENT
Start: 2021-07-19 | End: 2021-07-19 | Stop reason: HOSPADM

## 2021-07-19 RX ORDER — SODIUM CHLORIDE, SODIUM LACTATE, POTASSIUM CHLORIDE, CALCIUM CHLORIDE 600; 310; 30; 20 MG/100ML; MG/100ML; MG/100ML; MG/100ML
INJECTION, SOLUTION INTRAVENOUS CONTINUOUS
Status: DISCONTINUED | OUTPATIENT
Start: 2021-07-19 | End: 2021-07-21 | Stop reason: HOSPADM

## 2021-07-19 RX ORDER — NALOXONE HYDROCHLORIDE 0.4 MG/ML
0.4 INJECTION, SOLUTION INTRAMUSCULAR; INTRAVENOUS; SUBCUTANEOUS PRN
Status: DISCONTINUED | OUTPATIENT
Start: 2021-07-19 | End: 2021-07-21 | Stop reason: HOSPADM

## 2021-07-19 RX ORDER — PHENYLEPHRINE HCL IN 0.9% NACL 1 MG/10 ML
SYRINGE (ML) INTRAVENOUS PRN
Status: DISCONTINUED | OUTPATIENT
Start: 2021-07-19 | End: 2021-07-19 | Stop reason: SDUPTHER

## 2021-07-19 RX ORDER — MORPHINE SULFATE 1 MG/ML
INJECTION, SOLUTION EPIDURAL; INTRATHECAL; INTRAVENOUS PRN
Status: DISCONTINUED | OUTPATIENT
Start: 2021-07-19 | End: 2021-07-19 | Stop reason: SDUPTHER

## 2021-07-19 RX ORDER — KETOROLAC TROMETHAMINE 30 MG/ML
30 INJECTION, SOLUTION INTRAMUSCULAR; INTRAVENOUS EVERY 6 HOURS PRN
Status: DISPENSED | OUTPATIENT
Start: 2021-07-19 | End: 2021-07-20

## 2021-07-19 RX ORDER — MODIFIED LANOLIN
OINTMENT (GRAM) TOPICAL
Status: DISCONTINUED | OUTPATIENT
Start: 2021-07-19 | End: 2021-07-21 | Stop reason: HOSPADM

## 2021-07-19 RX ORDER — BUPIVACAINE HYDROCHLORIDE 7.5 MG/ML
INJECTION, SOLUTION INTRASPINAL PRN
Status: DISCONTINUED | OUTPATIENT
Start: 2021-07-19 | End: 2021-07-19 | Stop reason: SDUPTHER

## 2021-07-19 RX ORDER — MORPHINE SULFATE 2 MG/ML
2 INJECTION, SOLUTION INTRAMUSCULAR; INTRAVENOUS EVERY 5 MIN PRN
Status: DISCONTINUED | OUTPATIENT
Start: 2021-07-19 | End: 2021-07-19 | Stop reason: HOSPADM

## 2021-07-19 RX ORDER — SODIUM CHLORIDE, SODIUM LACTATE, POTASSIUM CHLORIDE, AND CALCIUM CHLORIDE .6; .31; .03; .02 G/100ML; G/100ML; G/100ML; G/100ML
1000 INJECTION, SOLUTION INTRAVENOUS ONCE
Status: COMPLETED | OUTPATIENT
Start: 2021-07-19 | End: 2021-07-19

## 2021-07-19 RX ORDER — BISACODYL 10 MG
10 SUPPOSITORY, RECTAL RECTAL PRN
Status: DISCONTINUED | OUTPATIENT
Start: 2021-07-19 | End: 2021-07-21 | Stop reason: HOSPADM

## 2021-07-19 RX ORDER — TRISODIUM CITRATE DIHYDRATE AND CITRIC ACID MONOHYDRATE 500; 334 MG/5ML; MG/5ML
30 SOLUTION ORAL ONCE
Status: COMPLETED | OUTPATIENT
Start: 2021-07-19 | End: 2021-07-19

## 2021-07-19 RX ORDER — SODIUM CHLORIDE 9 MG/ML
25 INJECTION, SOLUTION INTRAVENOUS PRN
Status: DISCONTINUED | OUTPATIENT
Start: 2021-07-19 | End: 2021-07-21 | Stop reason: HOSPADM

## 2021-07-19 RX ORDER — ONDANSETRON 2 MG/ML
4 INJECTION INTRAMUSCULAR; INTRAVENOUS EVERY 6 HOURS PRN
Status: DISCONTINUED | OUTPATIENT
Start: 2021-07-19 | End: 2021-07-21 | Stop reason: HOSPADM

## 2021-07-19 RX ORDER — HYDROCODONE BITARTRATE AND ACETAMINOPHEN 5; 325 MG/1; MG/1
2 TABLET ORAL EVERY 4 HOURS PRN
Status: DISCONTINUED | OUTPATIENT
Start: 2021-07-19 | End: 2021-07-21 | Stop reason: HOSPADM

## 2021-07-19 RX ORDER — OXYCODONE HYDROCHLORIDE 5 MG/1
5 TABLET ORAL EVERY 4 HOURS PRN
Status: DISCONTINUED | OUTPATIENT
Start: 2021-07-19 | End: 2021-07-21 | Stop reason: HOSPADM

## 2021-07-19 RX ORDER — PROMETHAZINE HYDROCHLORIDE 25 MG/ML
6.25 INJECTION, SOLUTION INTRAMUSCULAR; INTRAVENOUS
Status: DISCONTINUED | OUTPATIENT
Start: 2021-07-19 | End: 2021-07-19 | Stop reason: HOSPADM

## 2021-07-19 RX ORDER — DIPHENHYDRAMINE HYDROCHLORIDE 50 MG/ML
12.5 INJECTION INTRAMUSCULAR; INTRAVENOUS EVERY 6 HOURS PRN
Status: DISCONTINUED | OUTPATIENT
Start: 2021-07-19 | End: 2021-07-21 | Stop reason: HOSPADM

## 2021-07-19 RX ORDER — FENTANYL CITRATE 50 UG/ML
25 INJECTION, SOLUTION INTRAMUSCULAR; INTRAVENOUS EVERY 5 MIN PRN
Status: DISCONTINUED | OUTPATIENT
Start: 2021-07-19 | End: 2021-07-19 | Stop reason: HOSPADM

## 2021-07-19 RX ORDER — SODIUM CHLORIDE 0.9 % (FLUSH) 0.9 %
5-40 SYRINGE (ML) INJECTION EVERY 12 HOURS SCHEDULED
Status: DISCONTINUED | OUTPATIENT
Start: 2021-07-19 | End: 2021-07-21 | Stop reason: HOSPADM

## 2021-07-19 RX ORDER — DOCUSATE SODIUM 100 MG/1
100 CAPSULE, LIQUID FILLED ORAL 2 TIMES DAILY
Status: DISCONTINUED | OUTPATIENT
Start: 2021-07-19 | End: 2021-07-21 | Stop reason: HOSPADM

## 2021-07-19 RX ORDER — ACETAMINOPHEN 325 MG/1
650 TABLET ORAL EVERY 4 HOURS
Status: DISCONTINUED | OUTPATIENT
Start: 2021-07-19 | End: 2021-07-21 | Stop reason: HOSPADM

## 2021-07-19 RX ORDER — 0.9 % SODIUM CHLORIDE 0.9 %
250 INTRAVENOUS SOLUTION INTRAVENOUS
Status: DISCONTINUED | OUTPATIENT
Start: 2021-07-19 | End: 2021-07-19 | Stop reason: HOSPADM

## 2021-07-19 RX ORDER — NALBUPHINE HCL 10 MG/ML
5 AMPUL (ML) INJECTION EVERY 4 HOURS PRN
Status: DISCONTINUED | OUTPATIENT
Start: 2021-07-19 | End: 2021-07-21 | Stop reason: HOSPADM

## 2021-07-19 RX ORDER — HYDROCODONE BITARTRATE AND ACETAMINOPHEN 5; 325 MG/1; MG/1
1 TABLET ORAL EVERY 4 HOURS PRN
Status: DISCONTINUED | OUTPATIENT
Start: 2021-07-19 | End: 2021-07-21 | Stop reason: HOSPADM

## 2021-07-19 RX ORDER — ACETAMINOPHEN 325 MG/1
650 TABLET ORAL EVERY 4 HOURS PRN
Status: DISCONTINUED | OUTPATIENT
Start: 2021-07-19 | End: 2021-07-21 | Stop reason: HOSPADM

## 2021-07-19 RX ORDER — SODIUM CHLORIDE 0.9 % (FLUSH) 0.9 %
5-40 SYRINGE (ML) INJECTION PRN
Status: DISCONTINUED | OUTPATIENT
Start: 2021-07-19 | End: 2021-07-21 | Stop reason: HOSPADM

## 2021-07-19 RX ORDER — MEPERIDINE HYDROCHLORIDE 25 MG/ML
12.5 INJECTION INTRAMUSCULAR; INTRAVENOUS; SUBCUTANEOUS EVERY 5 MIN PRN
Status: DISCONTINUED | OUTPATIENT
Start: 2021-07-19 | End: 2021-07-19 | Stop reason: HOSPADM

## 2021-07-19 RX ADMIN — SODIUM CHLORIDE, POTASSIUM CHLORIDE, SODIUM LACTATE AND CALCIUM CHLORIDE 1000 ML: 600; 310; 30; 20 INJECTION, SOLUTION INTRAVENOUS at 06:00

## 2021-07-19 RX ADMIN — Medication 100 MCG: at 07:43

## 2021-07-19 RX ADMIN — Medication 100 MCG: at 07:30

## 2021-07-19 RX ADMIN — Medication 909 ML/HR: at 07:33

## 2021-07-19 RX ADMIN — KETOROLAC TROMETHAMINE 30 MG: 30 INJECTION, SOLUTION INTRAMUSCULAR; INTRAVENOUS at 16:01

## 2021-07-19 RX ADMIN — Medication 2000 MG: at 07:07

## 2021-07-19 RX ADMIN — BUPIVACAINE HYDROCHLORIDE IN DEXTROSE 1.6 ML: 7.5 INJECTION, SOLUTION SUBARACHNOID at 07:20

## 2021-07-19 RX ADMIN — Medication 100 MCG: at 07:20

## 2021-07-19 RX ADMIN — Medication 100 MCG: at 07:42

## 2021-07-19 RX ADMIN — SIMETHICONE 80 MG: 80 TABLET, CHEWABLE ORAL at 20:54

## 2021-07-19 RX ADMIN — SODIUM CHLORIDE, PRESERVATIVE FREE 10 ML: 5 INJECTION INTRAVENOUS at 17:55

## 2021-07-19 RX ADMIN — SODIUM CHLORIDE, POTASSIUM CHLORIDE, SODIUM LACTATE AND CALCIUM CHLORIDE: 600; 310; 30; 20 INJECTION, SOLUTION INTRAVENOUS at 07:03

## 2021-07-19 RX ADMIN — KETOROLAC TROMETHAMINE 30 MG: 30 INJECTION, SOLUTION INTRAMUSCULAR; INTRAVENOUS at 20:54

## 2021-07-19 RX ADMIN — ACETAMINOPHEN 650 MG: 325 TABLET ORAL at 20:53

## 2021-07-19 RX ADMIN — Medication: at 13:00

## 2021-07-19 RX ADMIN — MORPHINE SULFATE 0.15 MG: 1 INJECTION, SOLUTION EPIDURAL; INTRATHECAL; INTRAVENOUS at 07:20

## 2021-07-19 RX ADMIN — ONDANSETRON 4 MG: 2 INJECTION INTRAMUSCULAR; INTRAVENOUS at 07:31

## 2021-07-19 RX ADMIN — SODIUM CHLORIDE, POTASSIUM CHLORIDE, SODIUM LACTATE AND CALCIUM CHLORIDE: 600; 310; 30; 20 INJECTION, SOLUTION INTRAVENOUS at 10:51

## 2021-07-19 RX ADMIN — Medication 100 MCG: at 07:45

## 2021-07-19 RX ADMIN — SODIUM CHLORIDE, PRESERVATIVE FREE 10 ML: 5 INJECTION INTRAVENOUS at 20:54

## 2021-07-19 RX ADMIN — DOCUSATE SODIUM 100 MG: 100 CAPSULE ORAL at 20:54

## 2021-07-19 RX ADMIN — SODIUM CHLORIDE, POTASSIUM CHLORIDE, SODIUM LACTATE AND CALCIUM CHLORIDE: 600; 310; 30; 20 INJECTION, SOLUTION INTRAVENOUS at 07:42

## 2021-07-19 RX ADMIN — Medication 100 MCG: at 07:25

## 2021-07-19 RX ADMIN — SODIUM CITRATE AND CITRIC ACID MONOHYDRATE 30 ML: 500; 334 SOLUTION ORAL at 06:52

## 2021-07-19 ASSESSMENT — PULMONARY FUNCTION TESTS
PIF_VALUE: 0
PIF_VALUE: 1
PIF_VALUE: 0
PIF_VALUE: 1
PIF_VALUE: 0
PIF_VALUE: 1
PIF_VALUE: 0

## 2021-07-19 ASSESSMENT — PAIN SCALES - GENERAL
PAINLEVEL_OUTOF10: 0
PAINLEVEL_OUTOF10: 1
PAINLEVEL_OUTOF10: 1
PAINLEVEL_OUTOF10: 3

## 2021-07-19 NOTE — ANESTHESIA PRE PROCEDURE
Department of Anesthesiology  Preprocedure Note       Name:  Cori Mitchell   Age:  38 y.o.  :  1985                                          MRN:  [id number]         Date:  2021      Surgeon: Ernesto Mayersor): Autumn Bañuelos MD    Procedure:  SECTION (N/A Uterus)    Medications prior to admission:   Prior to Admission medications    Medication Sig Start Date End Date Taking? Authorizing Provider   Prenatal-DSS-FeCb-FeGl-FA (CITRANATAL BLOOM) 90-1 MG TABS Take 1 tablet by mouth daily 21   Autumn Bañuelos MD   Prenatal Vit-Fe Fumarate-FA (PRENATAL PLUS) 27-1 MG TABS Take 1 tablet by mouth daily 21   Autumn Bañuelos MD   drospirenone-ethinyl estradiol 3-0.03 MG TABS Take 1 tablet by mouth daily  Patient not taking: Reported on 2021   Autumn Bañuelos MD   ibuprofen (ADVIL;MOTRIN) 800 MG tablet Take 1 tablet by mouth every 8 hours as needed for Pain  Patient not taking: Reported on 2021   Autumn Bañuelos MD   folic acid-pyridoxine-cyanocobalamine (FOLTX) 2.5-25-1 MG TABS tablet Take 1 tablet by mouth daily    Historical Provider, MD       Current medications:    No current facility-administered medications for this visit. No current outpatient medications on file.      Facility-Administered Medications Ordered in Other Visits   Medication Dose Route Frequency Provider Last Rate Last Admin    lactated ringers infusion   Intravenous Continuous Autumn Bañuelos MD        lactated ringers bolus  1,000 mL Intravenous Once Autumn Bañuelos MD 1,000 mL/hr at 21 0600 1,000 mL at 21 0600    citric acid-sodium citrate (BICITRA) solution 30 mL  30 mL Oral Once Autumn Bañuelos MD        ceFAZolin (ANCEF) 2000 mg in sterile water 20 mL IV syringe  2,000 mg Intravenous Once Autumn Bañuelos MD        oxytocin (PITOCIN) 30 units in 500 mL infusion Override Pull                Allergies:  No Known Allergies    Problem List:    Patient Active Problem List Tests: No results for input(s): POCGLU, POCNA, POCK, POCCL, POCBUN, POCHEMO, POCHCT in the last 72 hours. Coags: No results found for: PROTIME, INR, APTT    HCG (If Applicable):   Lab Results   Component Value Date    PREGTESTUR pos 12/21/2020        ABGs: No results found for: PHART, PO2ART, TSU6XTK, ATB6ZIC, BEART, E7BROTQM     Type & Screen (If Applicable):  Lab Results   Component Value Date    LABABO A 01/18/2021    [address] POSITIVE 01/18/2021       Anesthesia Evaluation  Patient summary reviewed and Nursing notes reviewed no history of anesthetic complications:   Airway: Mallampati: III  TM distance: <3 FB   Neck ROM: full  Mouth opening: > = 3 FB Dental: normal exam     Comment: Veneers; pt denied any missing, loose or chipped teeth     Pulmonary:Negative Pulmonary ROS breath sounds clear to auscultation                             Cardiovascular:Negative CV ROS            Rhythm: regular  Rate: normal           Beta Blocker:  Not on Beta Blocker         Neuro/Psych:   Negative Neuro/Psych ROS              GI/Hepatic/Renal: Neg GI/Hepatic/Renal ROS            Endo/Other: Negative Endo/Other ROS                    Abdominal:             Vascular: negative vascular ROS. Other Findings: pregnant              Anesthesia Plan      spinal and general     ASA 2     (Plan for spinal anesthesia with general as back up)  Induction: intravenous and rapid sequence. MIPS: Prophylactic antiemetics administered. Anesthetic plan and risks discussed with patient. Plan discussed with CRNA and attending.                   Morgan Mckeon RN   7/19/2021

## 2021-07-19 NOTE — PLAN OF CARE
Problem: Fluid Volume - Imbalance:  Goal: Absence of postpartum hemorrhage signs and symptoms  Description: Absence of postpartum hemorrhage signs and symptoms  Outcome: Met This Shift     Problem: Fluid Volume - Imbalance:  Goal: Absence of imbalanced fluid volume signs and symptoms  Description: Absence of imbalanced fluid volume signs and symptoms  Outcome: Met This Shift     Problem: Infection - Surgical Site:  Goal: Will show no infection signs and symptoms  Description: Will show no infection signs and symptoms  Outcome: Met This Shift     Problem: Mood - Altered:  Goal: Mood stable  Description: Mood stable  Outcome: Met This Shift     Problem: Nausea/Vomiting:  Goal: Absence of nausea/vomiting  Description: Absence of nausea/vomiting  Outcome: Ongoing     Problem: Pain - Acute:  Goal: Pain level will decrease  Description: Pain level will decrease  Outcome: Met This Shift     Problem: Urinary Retention:  Goal: Urinary elimination within specified parameters  Description: Urinary elimination within specified parameters  Outcome: Met This Shift     Problem: Venous Thromboembolism:  Goal: Will show no signs or symptoms of venous thromboembolism  Description: Will show no signs or symptoms of venous thromboembolism  Outcome: Met This Shift     Problem: Venous Thromboembolism:  Goal: Absence of signs or symptoms of impaired coagulation  Description: Absence of signs or symptoms of impaired coagulation  Outcome: Met This Shift

## 2021-07-19 NOTE — LACTATION NOTE
Baby in NN at this time. Pt states her first baby latched fine but her supply suffered, states she did give formula in hospital. Discouraged use of pacifier and formula at this time so baby places much demand on the breast providing pt with a rationale to this effect. Pt has no health history known that would influence poor supply. Encouraged skin to skin and frequent attempts at breast to stimulate milk production. Instructed on normal infant behavior in the first 12-24 hours and importance of stimulating the baby frequently to eat during this time. Reviewed hand expression, and encouraged to hand express drops of colostrum when baby is sleepy. Instructed that baby may also feed 8-12 times a day- cluster feeding at times- as her milk supply is being established. Instructed on benefits of skin to skin. Educated on making sure infant has an open airway while breastfeeding and skin to skin. Instructed on hunger cues and waking techniques to try. Reviewed signs of adequate I & O; allow baby to feed ad samina and not to limit time at breast. Information given regarding health benefits of colostrum and exclusive breastfeeding. Encouraged to call with any concerns. Patient requests Electronic breast pump for home use to increase breast milk supply.

## 2021-07-19 NOTE — OP NOTE
Operative Note      Patient: Lynsey Abernathy  YOB: 1985  MRN: [id number]    Date of Procedure: 2021    Pre-Op Diagnosis: csection    Post-Op Diagnosis: Same       Procedure(s):   SECTION    Surgeon(s): Layla Fallon MD    Assistant: Phillip Samayoa MD    Anesthesia: Spinal    Estimated Blood Loss (mL): 240     Complications: None    Specimens:   * No specimens in log *    Implants:  * No implants in log *      Drains:   Urethral Catheter Non-latex 16 fr (Active)       First Assistant: Phillip Samayoa MD    The use of a first assistant surgeon was necessary because there was no qualified resident surgeon available. Phillip Samayoa MD  assisted in the proper positioning, prepping, and draping of the patient, intraoperative retraction and suctioning for visualization, passing sutures and suture management.   Electronically signed by Phillip Samayoa MD on 2021 at 7:48 AM

## 2021-07-19 NOTE — OP NOTE
PREOPERATIVE DIAGNOSES:     1. 38w1d intrauterine pregnancy. 2.  advanced maternal age  1. Previous -patient declining TOLAC  4. Incisional pain      POSTOPERATIVE DIAGNOSES:     Same.      PROCEDURE: repeat low transverse  section        SURGEON: Huyen Wilcox MD MD FACOG       ASSISTANTKomal Gupta MD       ESTIMATED BLOOD LOSS:  283XI        COMPLICATIONS:  None.         ANESTHESIA:   Spinal anesthesia        FINDINGS:  Erica Van Tassell Born  Sex:  Female  Fetal Position:  Cephalic, occiput posterior  Apgars:  1 minute: 9; 5 minute: 9  Weight: pending  Tubes, uterus, ovaries:  Normal, dense lesions involving the bladder, very thin lower uterine segment with a peritoneal window.           DETAILS OF PROCEDURE:    The patient was taken to the operating room where Spinal anesthesia was administered and found to be adequate. Abdomen was prepped   and draped in the normal sterile fashion. Meier catheter had previously been   placed. Pfannenstiel skin incision made with a scalpel, carried down to the fascia with cautery. The fascia was nicked in the midline and extended laterally with   cautery. Muscles were  in the midline. Peritoneum was grasped with   hemostats, tented up, and entered sharply. Peritoneal incision was extended   superiorly and inferiorly with good visualization of bladder. Delee bladder blade was introduced. Bladder flap was created with sharp dissection. Dense adhesions noted involving the bladder but very thin lower uterine segment with the peritoneal window seen. Low transverse uterine incision was made with kerais clampsCharity Sandy and extended bluntly. Membranes ruptured for Clear fluid. A viable female infant was delivered in the cephalic presentation without diffiuclty. Baby was bulb suctioned on the abdomen. Cord was clamped and cut, and handed to waiting R.N. Cord blood was obtained. Placenta was manually extracted with 3 vessel cord.  Uterus was exteriorized, cleared of all clot and debris. Uterine incision   was closed with vicryl in a running locked fashion. Good hemostasis was   noted. Uterus, tubes, and ovaries appeared normal. Uterus was returned to   the pelvis. The pelvis was irrigated, cleared of all clot and debris. Fascia was closed with 0 stratafix  in a running fashion. Subcutaneous tissue was irrigated, made hemostatic. Skin was closed with absorbable subcutaneous sutures. Baby and mom to recovery room in stable condition. Placenta to pathology: Yes.     MD MD Nirmal Vergara Pollen

## 2021-07-19 NOTE — PROGRESS NOTES
Pt presents to unit for scheduled  section for advanced maternal age and SGA. IUP @ 38w1d. . CHAD: 2021. Pt denies any VB, LOF, or ctx's and states +FM and otherwise uneventful pregnancy. EFM applied with FHT's obtained. VSS. Call light within reach.

## 2021-07-19 NOTE — H&P
CHIEF COMPLAINT:  Here for repeat c section    HISTORY OF PRESENT ILLNESS:      The patient is a 38 y.o. female at 43w4d. OB History        2    Para   1    Term   1       0    AB   0    Living   1       SAB   0    TAB   0    Ectopic   0    Molar        Multiple   0    Live Births   1            Patient presents with a chief complaint as above and is being admitted for   without tubal ligation    Estimated Due Date: Estimated Date of Delivery: 21    PRENATAL CARE:    Complicated by: AMA    PAST OB HISTORY  OB History        2    Para   1    Term   1       0    AB   0    Living   1       SAB   0    TAB   0    Ectopic   0    Molar        Multiple   0    Live Births   1                Past Medical History:        Diagnosis Date     delivery delivered 2019     Past Surgical History:        Procedure Laterality Date    APPENDECTOMY       SECTION N/A 2019     SECTION performed [facility] Venus Block MD at [facility]    LAPAROSCOPIC APPENDECTOMY  2017     Allergies:  Patient has no known allergies. Social History:    Social History     Socioeconomic History    Marital status:      Spouse name: Not on file    Number of children: Not on file    Years of education: Not on file    Highest education level: Not on file   Occupational History    Not on file   Tobacco Use    Smoking status: Never Smoker    Smokeless tobacco: Never Used   Substance and Sexual Activity    Alcohol use: No    Drug use: No    Sexual activity: Yes     Partners: Male   Other Topics Concern    Not on file   Social History Narrative    Not on file     Social Determinants of Health     Financial Resource Strain:     Difficulty of Paying Living Expenses:    Food Insecurity:     Worried About Running Out of Food in the Last Year:     [address] in the Last Year:    Transportation Needs:     Lack of Transportation (Medical):      Lack of Transportation (Non-Medical):    Physical Activity:     Days of Exercise per Week:     Minutes of Exercise per Session:    Stress:     Feeling of Stress :    Social Connections:     Frequency of Communication with Friends and Family:     Frequency of Social Gatherings with Friends and Family:     Attends [employer] Services:     Active Member of Clubs or Organizations:     Attends Club or Organization Meetings:     Marital Status:    Intimate Partner Violence:     Fear of Current or Ex-Partner:     Emotionally Abused:     Physically Abused:     Sexually Abused:      Family History:   History reviewed. No pertinent family history. Medications Prior to Admission:  Medications Prior to Admission: Prenatal-DSS-FeCb-FeGl-FA (CITRANATAL BLOOM) 90-1 MG TABS, Take 1 tablet by mouth daily  folic acid-pyridoxine-cyanocobalamine (FOLTX) 2.5-25-1 MG TABS tablet, Take 1 tablet by mouth daily  Prenatal Vit-Fe Fumarate-FA (PRENATAL PLUS) 27-1 MG TABS, Take 1 tablet by mouth daily  drospirenone-ethinyl estradiol 3-0.03 MG TABS, Take 1 tablet by mouth daily (Patient not taking: Reported on 1/18/2021)  ibuprofen (ADVIL;MOTRIN) 800 MG tablet, Take 1 tablet by mouth every 8 hours as needed for Pain (Patient not taking: Reported on 1/18/2021)    REVIEW OF SYSTEMS:    CONSTITUTIONAL:  negative  RESPIRATORY:  negative  CARDIOVASCULAR:  negative  GASTROINTESTINAL:  negative  ALLERGIC/IMMUNOLOGIC:  negative  NEUROLOGICAL:  negative  BEHAVIOR/PSYCH:  negative    PHYSICAL EXAM:  Vitals:    07/19/21 0539 07/19/21 0600   BP: 121/76    Pulse: 71    Resp: 18    Temp: 98.6 °F (37 °C)    TempSrc: Oral    SpO2: 100%    Weight:  172 lb (78 kg)   Height:  5' (1.524 m)     General appearance:  awake, alert, cooperative, no apparent distress, and appears stated age  Neurologic:  Awake, alert, oriented to name, place and time.     Lungs:  No increased work of breathing, good air exchange  Abdomen:  Soft, non tender, gravid, consistent with her gestational age   Fetal heart rate:  Reassuring.   Pelvis:  Adequate pelvis  Cervix: Closed 50% medium -3  Contraction frequency:  0 minutes    Membranes:  Intact    ASSESSMENT AND PLAN:  IUP at term  Previous C section X 1  AMA    Labor: Admit,  routine labor orders  Fetus: Reassuring  GBS: Yes  Other: IV hydration and antiemetics, IV antibiotic therapy, , NPO after midnight, R, B, A and possible complications discussed

## 2021-07-19 NOTE — PROGRESS NOTES
Repeat LTCS delivery of viable baby girl at 0. Baby pink and active at delivery. Delayed cord clamping done. Baby brought to warmer for evaluation. APGARS 9/9.

## 2021-07-19 NOTE — ANESTHESIA PROCEDURE NOTES
Spinal Block    Patient location during procedure: OR  Reason for block: primary anesthetic  Staffing  Performed: other anesthesia staff   Anesthesiologist: Phi Tinsley MD  Resident/CRNA: TOBIAS Grimes - CRNA  Other anesthesia staff: Analisa Pa RN  Preanesthetic Checklist  Completed: patient identified, IV checked, site marked, risks and benefits discussed, surgical consent, monitors and equipment checked, pre-op evaluation, timeout performed, anesthesia consent given, oxygen available and patient being monitored  Spinal Block  Patient position: sitting  Prep: ChloraPrep  Patient monitoring: cardiac monitor, continuous pulse ox, continuous capnometry and frequent blood pressure checks  Approach: midline  Location: L3/L4  Provider prep: mask and sterile gloves  Local infiltration: lidocaine  Dose: 0.2  Agent: bupivacaine  Adjuvant: duramorph  Dose: 1.6  Dose: 1.6  Needle  Needle type: Pencan   Needle gauge: 22 G  Needle length: 3.5 in  Assessment  Sensory level: T4  Swirl obtained: Yes  CSF: clear  Attempts: 1  Hemodynamics: stable

## 2021-07-20 LAB
HCT VFR BLD CALC: 30.2 % (ref 34–48)
HEMOGLOBIN: 10 G/DL (ref 11.5–15.5)

## 2021-07-20 PROCEDURE — 1220000000 HC SEMI PRIVATE OB R&B

## 2021-07-20 PROCEDURE — 85014 HEMATOCRIT: CPT

## 2021-07-20 PROCEDURE — 2580000003 HC RX 258: Performed by: OBSTETRICS & GYNECOLOGY

## 2021-07-20 PROCEDURE — 85018 HEMOGLOBIN: CPT

## 2021-07-20 PROCEDURE — 36415 COLL VENOUS BLD VENIPUNCTURE: CPT

## 2021-07-20 PROCEDURE — 6370000000 HC RX 637 (ALT 250 FOR IP): Performed by: OBSTETRICS & GYNECOLOGY

## 2021-07-20 PROCEDURE — 6370000000 HC RX 637 (ALT 250 FOR IP): Performed by: ANESTHESIOLOGY

## 2021-07-20 PROCEDURE — 6360000002 HC RX W HCPCS: Performed by: ANESTHESIOLOGY

## 2021-07-20 RX ORDER — HYDROCODONE BITARTRATE AND ACETAMINOPHEN 5; 325 MG/1; MG/1
1 TABLET ORAL EVERY 6 HOURS PRN
Qty: 18 TABLET | Refills: 0 | Status: SHIPPED | OUTPATIENT
Start: 2021-07-20 | End: 2021-07-27

## 2021-07-20 RX ORDER — IBUPROFEN 800 MG/1
800 TABLET ORAL EVERY 8 HOURS
Qty: 30 TABLET | Refills: 0 | Status: SHIPPED | OUTPATIENT
Start: 2021-07-20 | End: 2022-05-19

## 2021-07-20 RX ADMIN — METFORMIN HYDROCHLORIDE 1 TABLET: 500 TABLET, EXTENDED RELEASE ORAL at 14:51

## 2021-07-20 RX ADMIN — ACETAMINOPHEN 650 MG: 325 TABLET ORAL at 23:39

## 2021-07-20 RX ADMIN — KETOROLAC TROMETHAMINE 30 MG: 30 INJECTION, SOLUTION INTRAMUSCULAR; INTRAVENOUS at 05:25

## 2021-07-20 RX ADMIN — SODIUM CHLORIDE, PRESERVATIVE FREE 10 ML: 5 INJECTION INTRAVENOUS at 05:24

## 2021-07-20 RX ADMIN — IBUPROFEN 800 MG: 800 TABLET, FILM COATED ORAL at 14:52

## 2021-07-20 RX ADMIN — FOLIC ACID 1 MG: 1 TABLET ORAL at 14:52

## 2021-07-20 RX ADMIN — DOCUSATE SODIUM 100 MG: 100 CAPSULE ORAL at 14:52

## 2021-07-20 RX ADMIN — SIMETHICONE 80 MG: 80 TABLET, CHEWABLE ORAL at 14:52

## 2021-07-20 RX ADMIN — IBUPROFEN 800 MG: 800 TABLET, FILM COATED ORAL at 22:48

## 2021-07-20 RX ADMIN — DOCUSATE SODIUM 100 MG: 100 CAPSULE ORAL at 19:57

## 2021-07-20 ASSESSMENT — PAIN DESCRIPTION - PROGRESSION: CLINICAL_PROGRESSION: GRADUALLY WORSENING

## 2021-07-20 ASSESSMENT — PAIN SCALES - GENERAL
PAINLEVEL_OUTOF10: 2
PAINLEVEL_OUTOF10: 3
PAINLEVEL_OUTOF10: 0
PAINLEVEL_OUTOF10: 3
PAINLEVEL_OUTOF10: 3

## 2021-07-20 ASSESSMENT — PAIN DESCRIPTION - FREQUENCY: FREQUENCY: INTERMITTENT

## 2021-07-20 ASSESSMENT — PAIN DESCRIPTION - PAIN TYPE: TYPE: SURGICAL PAIN

## 2021-07-20 ASSESSMENT — PAIN DESCRIPTION - DESCRIPTORS: DESCRIPTORS: SORE;DISCOMFORT;TENDER

## 2021-07-20 ASSESSMENT — PAIN - FUNCTIONAL ASSESSMENT
PAIN_FUNCTIONAL_ASSESSMENT: ACTIVITIES ARE NOT PREVENTED
PAIN_FUNCTIONAL_ASSESSMENT: 0-10

## 2021-07-20 ASSESSMENT — PAIN DESCRIPTION - ORIENTATION: ORIENTATION: LOWER

## 2021-07-20 ASSESSMENT — PAIN DESCRIPTION - LOCATION: LOCATION: ABDOMEN;INCISION

## 2021-07-20 NOTE — LACTATION NOTE
Mom reported breastfeeding is going well, a lot better than her first born. I reminded mom how to unlatch her baby from the breast and to use her own breast milk to prevent sore nipples. Encouraged her to call with breastfeeding questions. Observed mom latch baby on and feed for several minutes. Mom did not need any assistance.     [place], [address] [facility]

## 2021-07-20 NOTE — PROGRESS NOTES
[employer] [place] [employer] screening results were discussed with parent. Questions answered. Brochure given to parent. Advised to monitor developmental milestones and contact physician for any concerns.    Memory Jose

## 2021-07-20 NOTE — ANESTHESIA POSTPROCEDURE EVALUATION
Department of Anesthesiology  Postprocedure Note    Patient: Fidencio Marie  MRN: [id number]  YOB: 1985  Date of evaluation: 2021  Time:  2:41 PM     Procedure Summary     Date: 21 Room / Location: [facility] OR  [facility]    Anesthesia Start: 1112 Anesthesia Stop: 4317    Procedure:  SECTION (N/A Uterus) Diagnosis: (csection)    Surgeons: Rupesh Armendariz MD Responsible Provider: Ramon Franz MD    Anesthesia Type: spinal, general ASA Status: 2          Anesthesia Type: spinal, general    Jorden Phase I: Jorden Score: 9    Jorden Phase II:      Last vitals: Reviewed and per EMR flowsheets.        Anesthesia Post Evaluation    Patient location during evaluation: bedside  Patient participation: complete - patient participated  Level of consciousness: awake and alert  Pain score: 0  Airway patency: patent  Nausea & Vomiting: no nausea  Complications: no  Cardiovascular status: hemodynamically stable  Respiratory status: acceptable and room air  Hydration status: euvolemic

## 2021-07-20 NOTE — PROGRESS NOTES
Subjective:     Postpartum Day 1:  Delivery    The patient feels well. Pain is well controlled with current medications. Baby is feeding via breast. Urinary output is adequate. The patient is ambulating well. The patient is tolerating a normal diet. Flatus has been passed. Objective:        Vitals:    21 0718   BP: 113/63   Pulse: 63   Resp: 16   Temp: 98.1 °F (36.7 °C)   SpO2: 97%         Intake/Output Summary (Last 24 hours) at 2021 1446  Last data filed at 2021 1806  Gross per 24 hour   Intake 1150 ml   Output 550 ml   Net 600 ml     Lab Results   Component Value Date    WBC 10.0 2021    HGB 10.0 (L) 2021    HCT 30.2 (L) 2021    MCV 89.3 2021     2021       General:    alert, appears stated age and cooperative   Lungs: clear to auscultation bilaterally   Lochia:  appropriate   Uterine    firm   Incision:  healing well, no significant drainage, no dehiscence, no significant erythema   DVT Evaluation:  No evidence of DVT seen on physical exam.     Assessment:     Status post  section. Doing well postoperatively. Plan:     Continue current care.

## 2021-07-21 VITALS
TEMPERATURE: 98.7 F | BODY MASS INDEX: 33.77 KG/M2 | RESPIRATION RATE: 18 BRPM | HEIGHT: 60 IN | HEART RATE: 71 BPM | DIASTOLIC BLOOD PRESSURE: 68 MMHG | WEIGHT: 172 LBS | OXYGEN SATURATION: 98 % | SYSTOLIC BLOOD PRESSURE: 123 MMHG

## 2021-07-21 PROBLEM — G89.18 ACUTE POSTOPERATIVE PAIN: Status: ACTIVE | Noted: 2021-07-21

## 2021-07-21 PROCEDURE — 6370000000 HC RX 637 (ALT 250 FOR IP): Performed by: OBSTETRICS & GYNECOLOGY

## 2021-07-21 PROCEDURE — 6370000000 HC RX 637 (ALT 250 FOR IP): Performed by: ANESTHESIOLOGY

## 2021-07-21 RX ADMIN — IBUPROFEN 800 MG: 800 TABLET, FILM COATED ORAL at 06:58

## 2021-07-21 RX ADMIN — METFORMIN HYDROCHLORIDE 1 TABLET: 500 TABLET, EXTENDED RELEASE ORAL at 07:54

## 2021-07-21 RX ADMIN — ACETAMINOPHEN 650 MG: 325 TABLET ORAL at 07:54

## 2021-07-21 RX ADMIN — DOCUSATE SODIUM 100 MG: 100 CAPSULE ORAL at 07:54

## 2021-07-21 RX ADMIN — FOLIC ACID 1 MG: 1 TABLET ORAL at 07:54

## 2021-07-21 ASSESSMENT — PAIN SCALES - GENERAL
PAINLEVEL_OUTOF10: 1
PAINLEVEL_OUTOF10: 4

## 2021-07-21 ASSESSMENT — PAIN DESCRIPTION - PROGRESSION: CLINICAL_PROGRESSION: NOT CHANGED

## 2021-07-21 NOTE — FLOWSHEET NOTE
Discharge teaching given to pt and FOB re self and infant. All questions answered and both verbalized understanding.

## 2021-07-21 NOTE — PLAN OF CARE
Problem: Infection - Surgical Site:  Goal: Will show no infection signs and symptoms  Description: Will show no infection signs and symptoms  7/20/2021 2352 by Rena Mcgowan RN  Outcome: Met This Shift  7/20/2021 1805 by Victor Baumgarten, RN  Outcome: Ongoing     Problem: Nausea/Vomiting:  Goal: Absence of nausea/vomiting  Description: Absence of nausea/vomiting  7/20/2021 2352 by Rena Mcgowan RN  Outcome: Met This Shift  7/20/2021 1805 by Victor Baumgarten, RN  Outcome: Ongoing     Problem: Pain - Acute:  Goal: Pain level will decrease  Description: Pain level will decrease  7/20/2021 2352 by Rena Mcgowan RN  Outcome: Met This Shift  7/20/2021 1805 by Victor Baumgarten, RN  Outcome: Ongoing     Problem: Pain:  Goal: Pain level will decrease  Description: Pain level will decrease  7/20/2021 2352 by Rena Mcgowan RN  Outcome: Met This Shift  7/20/2021 1805 by Victor Baumgarten, RN  Outcome: Ongoing

## 2021-07-21 NOTE — LACTATION NOTE
Mom reports baby is nursing well, milk is coming in. Encouraged frequent feeds to establish milk supply. Reviewed benefits and safety of skin to skin. Inst on adequate I/O and importance of keeping track of diapers at home. Instructed on signs of dehydration such as infant refusing to feed, decreased wet diapers and infant becoming listless and notify provider if these occur. Reviewed with mom the importance of notifying the physician if baby looks more jaundiced. Lactation office # given if follow-up needed, as well as other helpful resources. Encouraged to call with any concerns. Support and encouragement given.

## 2021-07-21 NOTE — PROGRESS NOTES
Subjective:     Postpartum Day 2:  Delivery    The patient feels well. Pain is well controlled with current medications. Baby is feeding via breast. Urinary output is adequate. The patient is ambulating well. The patient is tolerating a normal diet. Flatus has been passed. Objective:        Vitals:    21 0809   BP: 123/68   Pulse: 71   Resp: 18   Temp: 98.7 °F (37.1 °C)   SpO2: 98%       No intake or output data in the 24 hours ending 21 0951  Lab Results   Component Value Date    WBC 10.0 2021    HGB 10.0 (L) 2021    HCT 30.2 (L) 2021    MCV 89.3 2021     2021       General:    alert, appears stated age and cooperative       Lochia:  appropriate   Uterine    firm   Incision:  dressing intact   DVT Evaluation:  No evidence of DVT seen on physical exam.     Assessment:     Status post  section. Doing well postoperatively. Plan:     Discharge home with standard precautions and return to clinic in 1 week.

## 2021-07-21 NOTE — PROGRESS NOTES
CLINICAL PHARMACY NOTE: MEDS TO BEDS    Total # of Prescriptions Filled: 2   The following medications were delivered to the patient:  · Norco 5-325 mg  ·  mg    Additional Documentation:

## 2021-07-23 NOTE — DISCHARGE SUMMARY
Obstetrical Discharge Form        Patient ID:  Jan Mccallum  [id number]  27 y.o.  1985    Admit date: 2021    Discharge date: 2021     Admitting Physician: Kristyn Huff MD    Discharge Diagnoses: 38 weeks gestation of pregnancy [Z3A.38]    Final Diagnosis:   Active Problems:    38 weeks gestation of pregnancy    Acute postoperative pain  Resolved Problems:    * No resolved hospital problems. *      Discharged Condition: good      Gestational Age:38w1d    Antepartum complications: AMA    Date of Delivery: 21    Type of Delivery:  for repeat    Delivered By: Enrrique Broderick MD         Baby:     Information for the patient's :  Sagar Tate [[id number]]          Anesthesia: Spinal    Intrapartum complications: None    Feeding method: breast    Hospital Course: Uneventful. Patient recovered well without any issues     Discharged Condition: stable to home    Discharge Medication:    Nadya Blankenship   Home Medication Instructions LZO:[id number]    Printed on:21 5869   Medication Information                      folic acid-pyridoxine-cyanocobalamine (FOLTX) 2.5-25-1 MG TABS tablet  Take 1 tablet by mouth daily             HYDROcodone-acetaminophen (NORCO) 5-325 MG per tablet  Take 1 tablet by mouth every 6 hours as needed for Pain for up to 7 days.              ibuprofen (ADVIL;MOTRIN) 800 MG tablet  Take 1 tablet by mouth every 8 hours                  Postpartum complications: none    Note that over 30 minutes was spent in preparing discharge papers, discussing discharge with patient, medication review, etc.    Discharge Date: 2021    Plan:   Follow up in 2 week(s)

## 2022-02-15 ENCOUNTER — OFFICE VISIT (OUTPATIENT)
Dept: ENDOCRINOLOGY | Age: 37
End: 2022-02-15
Payer: COMMERCIAL

## 2022-02-15 VITALS
HEART RATE: 62 BPM | BODY MASS INDEX: 30.43 KG/M2 | HEIGHT: 60 IN | OXYGEN SATURATION: 99 % | SYSTOLIC BLOOD PRESSURE: 127 MMHG | WEIGHT: 155 LBS | DIASTOLIC BLOOD PRESSURE: 85 MMHG

## 2022-02-15 DIAGNOSIS — E05.90 HYPERTHYROIDISM: ICD-10-CM

## 2022-02-15 DIAGNOSIS — E05.90 HYPERTHYROIDISM: Primary | ICD-10-CM

## 2022-02-15 DIAGNOSIS — E55.9 VITAMIN D DEFICIENCY: ICD-10-CM

## 2022-02-15 LAB
ANION GAP SERPL CALCULATED.3IONS-SCNC: 11 MMOL/L (ref 7–16)
BUN BLDV-MCNC: 25 MG/DL (ref 6–20)
CALCIUM SERPL-MCNC: 9.4 MG/DL (ref 8.6–10.2)
CHLORIDE BLD-SCNC: 101 MMOL/L (ref 98–107)
CO2: 26 MMOL/L (ref 22–29)
CREAT SERPL-MCNC: 0.9 MG/DL (ref 0.5–1)
GFR AFRICAN AMERICAN: >60
GFR NON-AFRICAN AMERICAN: >60 ML/MIN/1.73
GLUCOSE BLD-MCNC: 98 MG/DL (ref 74–99)
POTASSIUM SERPL-SCNC: 4.3 MMOL/L (ref 3.5–5)
SODIUM BLD-SCNC: 138 MMOL/L (ref 132–146)
T3 TOTAL: 76.32 NG/DL (ref 80–200)
T4 FREE: 0.5 NG/DL (ref 0.93–1.7)
T4 TOTAL: 4.1 MCG/DL (ref 4.5–11.7)
TSH SERPL DL<=0.05 MIU/L-ACNC: 25.17 UIU/ML (ref 0.27–4.2)
VITAMIN D 25-HYDROXY: 55 NG/ML (ref 30–100)

## 2022-02-15 PROCEDURE — 1036F TOBACCO NON-USER: CPT | Performed by: INTERNAL MEDICINE

## 2022-02-15 PROCEDURE — G8417 CALC BMI ABV UP PARAM F/U: HCPCS | Performed by: INTERNAL MEDICINE

## 2022-02-15 PROCEDURE — G8427 DOCREV CUR MEDS BY ELIG CLIN: HCPCS | Performed by: INTERNAL MEDICINE

## 2022-02-15 PROCEDURE — G8484 FLU IMMUNIZE NO ADMIN: HCPCS | Performed by: INTERNAL MEDICINE

## 2022-02-15 PROCEDURE — 99204 OFFICE O/P NEW MOD 45 MIN: CPT | Performed by: INTERNAL MEDICINE

## 2022-02-15 NOTE — PROGRESS NOTES
[address]  [facility] Department of Endocrinology Diabetes and Metabolism   [address] [facility] [address]   Phone: [phone]  Fax: [phone]    Date of Service: 2/15/2022  Primary Care Physician: Avila Carlin DO  Referring physician: Danita Sanford DO  Provider: Graeme Jenkins MD    Reason for the visit:  Hyperthyroidism    History of Present Illness: The history is provided by the patient. No  was used. Accuracy of the patient data is excellent. Ilia Wright is a very pleasant 43 y.o. female seen today for evaluation and management of hyperthyroidism     Ilia Wright was diagnosed with hyperthyroidism in 2021 few months after delivery. At that time she was c/o intermittent palpitations, change in appetite, nervousness, anxiety, irritability, tremor, muscle weakness and difficulty sleeping. Labs confirmed that she is thyrotoxic   Lab Results   Component Value Date/Time    TSH 0.01 (L) 2021 08:30 AM    T4FREE 2.23 (H) 2021 08:30 AM     Pt was never stared on any medication for thyroid     Now still c/o of symptoms but less often     Pt reports positive FH of hashimoto's thyroid disease in her Mother and MGM     PAST MEDICAL HISTORY   Past Medical History:   Diagnosis Date     delivery delivered 2019       PAST SURGICAL HISTORY   Past Surgical History:   Procedure Laterality Date    APPENDECTOMY       SECTION N/A 2019     SECTION performed [facility] Hughes MD at [facility] L&D     SECTION N/A 2021     SECTION performed by Daisha Hughes MD at [facility] L&D OR    LAPAROSCOPIC APPENDECTOMY  2017       SOCIAL HISTORY   Tobacco:   reports that she has never smoked. She has never used smokeless tobacco.  Alcohol:   reports no history of alcohol use. Drugs:   reports no history of drug use. FAMILY HISTORY   No family history on file.     ALLERGIES AND DRUG REACTIONS No Known Allergies    CURRENT MEDICATIONS   Current Outpatient Medications   Medication Sig Dispense Refill    Prenat-FeFmCb-DSS-FA-DHA w/o A (CITRANATAL HARMONY) 27-1-260 MG CAPS Take 1 capsule by mouth daily (Patient not taking: Reported on 10/25/2021) 30 capsule 11    ibuprofen (ADVIL;MOTRIN) 800 MG tablet Take 1 tablet by mouth every 8 hours (Patient not taking: Reported on 10/25/2021) 30 tablet 0    folic acid-pyridoxine-cyanocobalamine (FOLTX) 2.5-25-1 MG TABS tablet Take 1 tablet by mouth daily (Patient not taking: Reported on 10/25/2021)       Current Facility-Administered Medications   Medication Dose Route Frequency Provider Last Rate Last Admin    Paragard Intrauterine Copper IUD 1 each  1 each IntraUTERine Once Sari Short MD   1 each at 10/25/21 0916       Review of Systems  Constitutional: No fever, no chills, no diaphoresis, no generalized weakness. HEENT: No blurred vision, No sore throat, no ear pain, no hair loss  Neck: denied any neck swelling, difficulty swallowing,   Cadrdiopulomary: No CP, SOB or palpitation, No orthopnea or PND. No cough or wheezing. GI: No N/V/D, no constipation, No abdominal pain, no melena or hematochezia   : Denied any dysuria, hematuria, flank pain, discharge, or incontinence. Skin: denied any rash, ulcer, Hirsute, or hyperpigmentation. MSK: denied any joint deformity, joint pain/swelling, muscle pain, or back pain.   Neuro: no numbess, no tingling, no weakness,     OBJECTIVE    /85   Pulse 62   Ht 5' (1.524 m)   Wt 155 lb (70.3 kg)   SpO2 99%   BMI 30.27 kg/m²   BP Readings from Last 4 Encounters:   02/15/22 127/85   01/26/22 135/83   11/17/21 131/78   10/25/21 128/82     Wt Readings from Last 6 Encounters:   02/15/22 155 lb (70.3 kg)   01/26/22 156 lb (70.8 kg)   11/17/21 151 lb (68.5 kg)   10/25/21 159 lb (72.1 kg)   08/26/21 159 lb (72.1 kg)   07/22/21 166 lb (75.3 kg)       Physical examination:  General: awake alert, oriented x3, no abnormal position or movements. HEENT: normocephalic non traumatic, no exophthalmos, no lid lag, no lid retraction   Neck: supple, no LN enlargement, no thyromegaly, no thyroid tenderness, no thyroid bruit, no JVD. Pulm: Clear equal air entry no added sounds, no wheezing or rhonchi    CVS: S1 + S2, no murmur, no heave. Dorsalis pedis pulse palpable   Abd: soft lax, no tenderness, no organomegaly, audible bowel sounds. Skin: warm, no lesions, no rash.  No palmar erythema, no onycholysis,    Musculoskeletal: No back tenderness, no kyphosis/scoliosis    Neuro: CN intact, sensation notmal , muscle power normal. No tremors   Psych: normal mood, and affect    Review of Laboratory Data:  I have reviewed the following:  Lab Results   Component Value Date/Time    WBC 6.8 11/17/2021 08:30 AM    WBC 10.0 07/19/2021 05:50 AM    RBC 5.12 11/17/2021 08:30 AM    HGB 14.3 11/17/2021 08:30 AM    HCT 44.0 11/17/2021 08:30 AM    MCV 86 11/17/2021 08:30 AM    MCH 27.9 11/17/2021 08:30 AM    MCHC 32.4 11/17/2021 08:30 AM    RDW 12.5 11/17/2021 08:30 AM     11/17/2021 08:30 AM     07/19/2021 05:50 AM    MPV 9.1 11/17/2021 08:30 AM      Lab Results   Component Value Date/Time     02/22/2017 04:13 AM    K 4.5 02/22/2017 04:13 AM    CO2 23 02/22/2017 04:13 AM    BUN 11 02/22/2017 04:13 AM    CREATININE 0.8 02/22/2017 04:13 AM    CALCIUM 8.8 02/22/2017 04:13 AM    LABGLOM >60 02/22/2017 04:13 AM    GFRAA >60 02/22/2017 04:13 AM      Lab Results   Component Value Date/Time    TSH 0.01 (L) 11/17/2021 08:30 AM    T4FREE 2.23 (H) 11/17/2021 08:30 AM     Lab Results   Component Value Date    GLUCOSE 120 02/22/2017     No results found for: TRIG, HDL, LDLCALC, CHOL  No results found for: [address] [facility] [address], a 43 y.o.-old female seen in for management of following issues      Hyperthyroidism  · Post-partum thyroiditis, vs hashimoto's thyroiditis vs graves' disease vs toxic thyroid nodule(s)   · Today will check TSH, free T4, T4, T3, TSI, TPO-Ab, Tg-Ab  · Will also obtain thyroid US   · Will follow results and proceed accordingly     Bone health/vitD deficiency    · Discussed the effect of hyperthyroidism on bone health  · Continue vitD supplement   · Check vitD level today     I personally reviewed external notes from PCP and other patient's care team providers, and personally interpreted labs associated with the above diagnosis. I also ordered labs to further assess and manage the above addressed medical conditions. Return in about 4 months (around 6/15/2022) for Hyperthyroidism, VitD deficiency. The above issues were reviewed with the patient who understood and agreed with the plan. Thank you for allowing us to participate in the care of this patient. Please do not hesitate to contact us with any additional questions. Diagnosis Orders   1. Hyperthyroidism  TSH without Reflex    T4, Free    T4    T3    Thyroid AB    US THYROID    Thyroid Stimulating Immunoglobulin   2. Vitamin D deficiency  Vitamin D 25 Hydroxy    Basic Metabolic Panel     Guero Moulton MD  Endocrinologist, [facility] - BEHAVIORAL HEALTH SERVICES Diabetes Care and Endocrinology   [address] [facility] [address]   Phone: [phone]  Fax: [phone]  -------------------------  An electronic signature was used to authenticate this note.  Juan C Leung MD on 2/15/2022 at 9:12 AM

## 2022-02-17 ENCOUNTER — TELEPHONE (OUTPATIENT)
Dept: ENDOCRINOLOGY | Age: 37
End: 2022-02-17

## 2022-02-17 DIAGNOSIS — E05.90 HYPERTHYROIDISM: Primary | ICD-10-CM

## 2022-02-17 DIAGNOSIS — E03.9 HYPOTHYROIDISM, UNSPECIFIED TYPE: ICD-10-CM

## 2022-02-17 RX ORDER — LEVOTHYROXINE SODIUM 0.05 MG/1
50 TABLET ORAL DAILY
Qty: 30 TABLET | Refills: 5 | Status: SHIPPED | OUTPATIENT
Start: 2022-02-17 | End: 2022-03-14

## 2022-02-18 NOTE — TELEPHONE ENCOUNTER
Notify pt,  I have reviewed your recent results    Your thyroid hormones are very low and previously abnormal thyroid results likely due to thyroiditis     I strongly recommend starting Levothyroxine 50 mcg daily and recheck labs in 6-8 weeks    Please take levothyroxine in the morning at empty stomach, wait one hour before eating , avoid multivitamins containing calcium  or iron with it.

## 2022-02-19 ENCOUNTER — HOSPITAL ENCOUNTER (OUTPATIENT)
Dept: ULTRASOUND IMAGING | Age: 37
Discharge: HOME | End: 2022-02-21
Payer: COMMERCIAL

## 2022-02-19 DIAGNOSIS — E05.90 HYPERTHYROIDISM: ICD-10-CM

## 2022-02-19 LAB — THYROID STIMULATING IMMUNOGLOBULIN: <0.1 IU/L

## 2022-02-19 PROCEDURE — 76536 US EXAM OF HEAD AND NECK: CPT

## 2022-02-20 ENCOUNTER — TELEPHONE (OUTPATIENT)
Dept: ENDOCRINOLOGY | Age: 37
End: 2022-02-20

## 2022-02-20 NOTE — TELEPHONE ENCOUNTER
Notify pt,  I have reviewed your recent results    Excellent!  Thyroid US was negative for any thyroid nodules

## 2022-02-21 LAB
THYROGLOBULIN ANTIBODY: 2901 IU/ML (ref 0–40)
THYROID PEROXIDASE (TPO) ABS: 32 IU/ML (ref 0–25)

## 2022-03-12 DIAGNOSIS — E03.9 HYPOTHYROIDISM, UNSPECIFIED TYPE: ICD-10-CM

## 2022-03-14 RX ORDER — LEVOTHYROXINE SODIUM 0.05 MG/1
TABLET ORAL
Qty: 30 TABLET | Refills: 5 | Status: SHIPPED
Start: 2022-03-14 | End: 2022-04-14

## 2022-04-13 DIAGNOSIS — E03.9 HYPOTHYROIDISM, UNSPECIFIED TYPE: ICD-10-CM

## 2022-04-13 LAB
T4 FREE: 0.99 NG/DL (ref 0.93–1.7)
TSH SERPL DL<=0.05 MIU/L-ACNC: 6.89 UIU/ML (ref 0.27–4.2)

## 2022-04-14 ENCOUNTER — TELEPHONE (OUTPATIENT)
Dept: ENDOCRINOLOGY | Age: 37
End: 2022-04-14

## 2022-04-14 DIAGNOSIS — E03.9 HYPOTHYROIDISM, UNSPECIFIED TYPE: Primary | ICD-10-CM

## 2022-04-14 RX ORDER — LEVOTHYROXINE SODIUM 88 UG/1
88 TABLET ORAL DAILY
Qty: 90 TABLET | Refills: 3 | Status: SHIPPED
Start: 2022-04-14 | End: 2022-07-05

## 2022-04-14 NOTE — TELEPHONE ENCOUNTER
Notify pt,  I have reviewed your recent results    Thyroid hormones are much better but still low comparing with previous testing    Will change levothyroxine from 50 to 88 mcg daily    Recheck level few days before next visit in June

## 2022-06-09 DIAGNOSIS — E03.9 HYPOTHYROIDISM, UNSPECIFIED TYPE: ICD-10-CM

## 2022-06-09 DIAGNOSIS — E03.9 HYPOTHYROIDISM, UNSPECIFIED TYPE: Primary | ICD-10-CM

## 2022-06-09 LAB
T3 TOTAL: 92.27 NG/DL (ref 80–200)
T4 FREE: 1.4 NG/DL (ref 0.93–1.7)
T4 TOTAL: 8.8 MCG/DL (ref 4.5–11.7)
TSH SERPL DL<=0.05 MIU/L-ACNC: 1.98 UIU/ML (ref 0.27–4.2)

## 2022-06-13 ENCOUNTER — OFFICE VISIT (OUTPATIENT)
Dept: ENDOCRINOLOGY | Age: 37
End: 2022-06-13
Payer: COMMERCIAL

## 2022-06-13 VITALS
DIASTOLIC BLOOD PRESSURE: 85 MMHG | WEIGHT: 145 LBS | SYSTOLIC BLOOD PRESSURE: 127 MMHG | HEIGHT: 60 IN | BODY MASS INDEX: 28.47 KG/M2 | HEART RATE: 54 BPM | OXYGEN SATURATION: 98 %

## 2022-06-13 DIAGNOSIS — E03.9 HYPOTHYROIDISM, UNSPECIFIED TYPE: Primary | ICD-10-CM

## 2022-06-13 PROCEDURE — 99213 OFFICE O/P EST LOW 20 MIN: CPT | Performed by: CLINICAL NURSE SPECIALIST

## 2022-06-13 PROCEDURE — 1036F TOBACCO NON-USER: CPT | Performed by: CLINICAL NURSE SPECIALIST

## 2022-06-13 PROCEDURE — G8427 DOCREV CUR MEDS BY ELIG CLIN: HCPCS | Performed by: CLINICAL NURSE SPECIALIST

## 2022-06-13 PROCEDURE — G8417 CALC BMI ABV UP PARAM F/U: HCPCS | Performed by: CLINICAL NURSE SPECIALIST

## 2022-06-13 NOTE — PROGRESS NOTES
[address]  [facility] Department of Endocrinology Diabetes and Metabolism   [address] [facility] [address]   Phone: [phone]  Fax: [phone]    Date of Service: 2022    Primary Care Physician: Lanette Krabbe, DO  Referring physician: No ref. provider found  Provider: TOBIAS Henry - CNS     Reason for the visit:  Hypothyroidism       History of Present Illness: The history is provided by the patient. No  was used. Accuracy of the patient data is excellent. Chase Phillips is a very pleasant 43 y.o. female seen today for hypothyroidism     Pt 1st diagnosed with hypothyroidism on 2022. Initially thought to have hyperthyroid but had thyroiditis than transitioned to hypothyroid. Symptoms consistent with hypothyroidism include hair loss, fatigue, constipation, dry skin    Treatment that the patient has had includes LT4 88 mcg onve tablet once daily  . Associated symptoms include denies . Last labs:   Lab Results   Component Value Date    TSH 1.980 2022    H4ZYHXR 92.27 2022    K4MVGHO 8.8 2022    T4FREE 1.40 2022         Thyroid ultrasound () right lobe 4.1 x 1.5 x 1.4 cm and left lobe 4.1 x 1.3 x 1.6 cm,  no nodules      PAST MEDICAL HISTORY   Past Medical History:   Diagnosis Date     delivery delivered 2019       PAST SURGICAL HISTORY   Past Surgical History:   Procedure Laterality Date    APPENDECTOMY       SECTION N/A 2019     SECTION performed by Richmond Samayoa MD at [facility] L&D     SECTION N/A 2021     SECTION performed by Richmond Samayoa MD at [facility] L&D OR    LAPAROSCOPIC APPENDECTOMY  2017       SOCIAL HISTORY   Tobacco:   reports that she has never smoked. She has never used smokeless tobacco.  Alcohol:   reports no history of alcohol use. Drugs:   reports no history of drug use.     FAMILY HISTORY   No family history on file.    ALLERGIES AND DRUG REACTIONS   No Known Allergies    CURRENT MEDICATIONS   Current Outpatient Medications   Medication Sig Dispense Refill    levothyroxine (SYNTHROID) 88 MCG tablet Take 1 tablet by mouth daily 90 tablet 3     Current Facility-Administered Medications   Medication Dose Route Frequency Provider Last Rate Last Admin    Paragard Intrauterine Copper IUD 1 each  1 each IntraUTERine Once Nneka Hess MD   1 each at 10/25/21 0916       Review of Systems  Constitutional: No fever, no chills, no diaphoresis, no generalized weakness. HEENT: No blurred vision, No sore throat, no ear pain, no hair loss  Neck: denied any neck swelling, difficulty swallowing,   Cardio-pulmonary: No CP, SOB or palpitation, No orthopnea or PND. No cough or wheezing. GI: No N/V/D, no constipation, No abdominal pain, no melena or hematochezia   : Denied any dysuria, hematuria, flank pain, discharge, or incontinence. Skin: denied any rash, ulcer, Hirsute, or hyperpigmentation. MSK: denied any joint deformity, joint pain/swelling, muscle pain, or back pain. Neuro: no numbness, no tingling, no weakness, _    OBJECTIVE    /85   Pulse 54   Ht 5' (1.524 m)   Wt 145 lb (65.8 kg)   SpO2 98%   BMI 28.32 kg/m²   BP Readings from Last 4 Encounters:   06/13/22 127/85   05/19/22 121/82   02/15/22 127/85   01/26/22 135/83     Wt Readings from Last 6 Encounters:   06/13/22 145 lb (65.8 kg)   05/19/22 146 lb (66.2 kg)   02/15/22 155 lb (70.3 kg)   01/26/22 156 lb (70.8 kg)   11/17/21 151 lb (68.5 kg)   10/25/21 159 lb (72.1 kg)       Physical examination:  General: awake alert, oriented x3, no abnormal position or movements. HEENT: normocephalic non-traumatic, no exophthalmos   Neck: supple, no LN enlargement, no thyromegaly, no thyroid tenderness, no JVD. Pulm: Clear equal air entry no added sounds, no wheezing or rhonchi    CVS: S1 + S2, no murmur, no heave.  Dorsalis pedis pulse palpable   Abd: soft lax, no tenderness, no organomegaly, audible bowel sounds. Skin: warm, no lesions, no rash. Musculoskeletal: No back tenderness, no kyphosis/scoliosis    Neuro: CN intact, , muscle power normal  Psych: normal mood, and affect      Review of Laboratory Data:  I personally reviewed the following lab:  Lab Results   Component Value Date/Time    WBC 6.8 11/17/2021 08:30 AM    WBC 10.0 07/19/2021 05:50 AM    RBC 5.12 11/17/2021 08:30 AM    HGB 14.3 11/17/2021 08:30 AM    HCT 44.0 11/17/2021 08:30 AM    MCV 86 11/17/2021 08:30 AM    MCH 27.9 11/17/2021 08:30 AM    MCHC 32.4 11/17/2021 08:30 AM    RDW 12.5 11/17/2021 08:30 AM     11/17/2021 08:30 AM     07/19/2021 05:50 AM    MPV 9.1 11/17/2021 08:30 AM      Lab Results   Component Value Date/Time     02/15/2022 09:33 AM    K 4.3 02/15/2022 09:33 AM    CO2 26 02/15/2022 09:33 AM    BUN 25 (H) 02/15/2022 09:33 AM    CREATININE 0.9 02/15/2022 09:33 AM    CALCIUM 9.4 02/15/2022 09:33 AM    LABGLOM >60 02/15/2022 09:33 AM    GFRAA >60 02/15/2022 09:33 AM      Lab Results   Component Value Date/Time    TSH 1.980 06/09/2022 12:09 PM    T4FREE 1.40 06/09/2022 12:09 PM    F3KDAAQ 8.8 06/09/2022 12:09 PM    U2VJWYL 92.27 06/09/2022 12:09 PM    TSI <0.10 02/15/2022 09:34 AM    TPOABS 32.0 (H) 02/15/2022 09:33 AM     Lab Results   Component Value Date    GLUCOSE 98 02/15/2022     No results found for: LABA1C  No results found for: TRIG, HDL, LDLCALC, CHOL  Lab Results   Component Value Date    VITD25 55 02/15/2022       ASSESSMENT & RECOMMENDATIONS   Roise Abernathy, a 43 y.o.-old female seen in for the following issues       Assessment:      Diagnosis Orders   1. Hypothyroidism, unspecified type         Plan:     1.  Hypothyroidism, unspecified type   · Last TFTs at goal  · Patient complaining of severe fatigue, constipation, hair loss, dry skin  · Will attempt brand Synthroid 88 mcg 1 tablet once daily  · Patient advised to take on an empty stomach at least 1 hour before breakfast and without combination of other medications  · Will call in a couple weeks and let us know if symptoms improve  · Counseled on symptoms of hypothyroidism  · Patient verbalized understanding         I personally spent > 20 minutes reviewing  external notes from PCP and other patient's care team providers, and personally interpreted labs associated with the above diagnosis. I also ordered labs to further assess and manage the above addressed medical conditions. Return in about 3 months (around 9/13/2022). The above issues were reviewed with the patient who understood and agreed with the plan. Thank you for allowing us to participate in the care of this patient. Please do not hesitate to contact us with any additional questions. TOBIAS Ty     [address] Diabetes Care and Endocrinology   [address] [facility], [address] [place] [address]   Phone: [phone]  Fax: [phone]  --------------------------------------------  An electronic signature was used to authenticate this note.  TOBIAS Ty on 6/13/2022 at 8:50 AM

## 2022-07-05 DIAGNOSIS — E03.9 HYPOTHYROIDISM, UNSPECIFIED TYPE: Primary | ICD-10-CM

## 2022-07-05 RX ORDER — LEVOTHYROXINE SODIUM 88 MCG
88 TABLET ORAL DAILY
Qty: 30 TABLET | Refills: 3
Start: 2022-07-05 | End: 2022-10-13 | Stop reason: SDUPTHER

## 2022-10-13 ENCOUNTER — OFFICE VISIT (OUTPATIENT)
Dept: ENDOCRINOLOGY | Age: 37
End: 2022-10-13
Payer: COMMERCIAL

## 2022-10-13 VITALS
WEIGHT: 136 LBS | HEART RATE: 58 BPM | HEIGHT: 60 IN | DIASTOLIC BLOOD PRESSURE: 81 MMHG | SYSTOLIC BLOOD PRESSURE: 129 MMHG | BODY MASS INDEX: 26.7 KG/M2 | OXYGEN SATURATION: 96 %

## 2022-10-13 DIAGNOSIS — R53.83 FATIGUE, UNSPECIFIED TYPE: ICD-10-CM

## 2022-10-13 DIAGNOSIS — E03.9 HYPOTHYROIDISM, UNSPECIFIED TYPE: Primary | ICD-10-CM

## 2022-10-13 DIAGNOSIS — E03.9 HYPOTHYROIDISM, UNSPECIFIED TYPE: ICD-10-CM

## 2022-10-13 LAB
T4 FREE: 1.63 NG/DL (ref 0.93–1.7)
TSH SERPL DL<=0.05 MIU/L-ACNC: 1.2 UIU/ML (ref 0.27–4.2)
VITAMIN B-12: 815 PG/ML (ref 211–946)

## 2022-10-13 PROCEDURE — G8484 FLU IMMUNIZE NO ADMIN: HCPCS | Performed by: CLINICAL NURSE SPECIALIST

## 2022-10-13 PROCEDURE — G8417 CALC BMI ABV UP PARAM F/U: HCPCS | Performed by: CLINICAL NURSE SPECIALIST

## 2022-10-13 PROCEDURE — 99213 OFFICE O/P EST LOW 20 MIN: CPT | Performed by: CLINICAL NURSE SPECIALIST

## 2022-10-13 PROCEDURE — G8427 DOCREV CUR MEDS BY ELIG CLIN: HCPCS | Performed by: CLINICAL NURSE SPECIALIST

## 2022-10-13 PROCEDURE — 1036F TOBACCO NON-USER: CPT | Performed by: CLINICAL NURSE SPECIALIST

## 2022-10-13 RX ORDER — LEVOTHYROXINE SODIUM 88 MCG
88 TABLET ORAL DAILY
Qty: 30 TABLET | Refills: 11 | Status: SHIPPED | OUTPATIENT
Start: 2022-10-13

## 2022-10-13 NOTE — PROGRESS NOTES
[address]  [facility] Department of Endocrinology Diabetes and Metabolism   [address] [facility] [address]   Phone: [phone]  Fax: [phone]    Date of Service: 10/13/2022    Primary Care Physician: Angelito Pringle DO  Referring physician: No ref. provider found  Provider: TOBIAS Mccormick - CHELY     Reason for the visit:  Hypothyroidism       History of Present Illness: The history is provided by the patient. No  was used. Accuracy of the patient data is excellent. Iron Hager is a very pleasant 43 y.o. female seen today for hypothyroidism     Pt 1st diagnosed with hypothyroidism on 2022. Initially thought to have hyperthyroid but had thyroiditis than transitioned to hypothyroid. Symptoms consistent with hypothyroidism include hair loss, fatigue, constipation, dry skin    Treatment that the patient has had includes Brand synthroid 88 mcg 1  tablet once daily  . Associated symptoms include denies . Last labs:   Lab Results   Component Value Date    TSH 1.980 2022    T8CDHXE 92.27 2022    X8NYPAH 8.8 2022    T4FREE 1.40 2022         Thyroid ultrasound () right lobe 4.1 x 1.5 x 1.4 cm and left lobe 4.1 x 1.3 x 1.6 cm,  no nodules      PAST MEDICAL HISTORY   Past Medical History:   Diagnosis Date     delivery delivered 2019       PAST SURGICAL HISTORY   Past Surgical History:   Procedure Laterality Date    APPENDECTOMY       SECTION N/A 2019     SECTION performed by Portillo Russell MD at [facility] L&D     SECTION N/A 2021     SECTION performed by Portillo Russell MD at [facility] L&D OR    LAPAROSCOPIC APPENDECTOMY  2017       SOCIAL HISTORY   Tobacco:   reports that she has never smoked. She has never used smokeless tobacco.  Alcohol:   reports no history of alcohol use. Drugs:   reports no history of drug use.     FAMILY HISTORY   No family history on file.    ALLERGIES AND DRUG REACTIONS   No Known Allergies    CURRENT MEDICATIONS   Current Outpatient Medications   Medication Sig Dispense Refill    SYNTHROID 88 MCG tablet Take 1 tablet by mouth Daily 30 tablet 11    BLISOVI 24 FE 1-20 MG-MCG(24) TABS TAKE 1 TABLET BY MOUTH EVERY DAY 28 tablet 2     Current Facility-Administered Medications   Medication Dose Route Frequency Provider Last Rate Last Admin    Paragard Intrauterine Copper IUD 1 each  1 each IntraUTERine Once Enrique Jones MD   1 each at 10/25/21 0916       Review of Systems  Constitutional: No fever, no chills, no diaphoresis, no generalized weakness. HEENT: No blurred vision, No sore throat, no ear pain, no hair loss  Neck: denied any neck swelling, difficulty swallowing,   Cardio-pulmonary: No CP, SOB or palpitation, No orthopnea or PND. No cough or wheezing. GI: No N/V/D, no constipation, No abdominal pain, no melena or hematochezia   : Denied any dysuria, hematuria, flank pain, discharge, or incontinence. Skin: denied any rash, ulcer, Hirsute, or hyperpigmentation. MSK: denied any joint deformity, joint pain/swelling, muscle pain, or back pain. Neuro: no numbness, no tingling, no weakness, _    OBJECTIVE    /81   Pulse 58   Ht 5' (1.524 m)   Wt 136 lb (61.7 kg)   SpO2 96%   BMI 26.56 kg/m²   BP Readings from Last 4 Encounters:   10/13/22 129/81   08/18/22 115/73   06/13/22 127/85   05/19/22 121/82     Wt Readings from Last 6 Encounters:   10/13/22 136 lb (61.7 kg)   08/18/22 141 lb (64 kg)   06/13/22 145 lb (65.8 kg)   05/19/22 146 lb (66.2 kg)   02/15/22 155 lb (70.3 kg)   01/26/22 156 lb (70.8 kg)       Physical examination:  General: awake alert, oriented x3, no abnormal position or movements. HEENT: normocephalic non-traumatic, no exophthalmos   Neck: supple, no LN enlargement, no thyromegaly, no thyroid tenderness, no JVD.   Pulm: Clear equal air entry no added sounds, no wheezing or rhonchi    CVS: S1 + S2, no murmur, no heave. Dorsalis pedis pulse palpable   Abd: soft lax, no tenderness, no organomegaly, audible bowel sounds. Skin: warm, no lesions, no rash. Musculoskeletal: No back tenderness, no kyphosis/scoliosis    Neuro: CN intact, , muscle power normal  Psych: normal mood, and affect      Review of Laboratory Data:  I personally reviewed the following lab:  Lab Results   Component Value Date/Time    WBC 6.8 11/17/2021 08:30 AM    WBC 10.0 07/19/2021 05:50 AM    RBC 5.12 11/17/2021 08:30 AM    HGB 14.3 11/17/2021 08:30 AM    HCT 44.0 11/17/2021 08:30 AM    MCV 86 11/17/2021 08:30 AM    MCH 27.9 11/17/2021 08:30 AM    MCHC 32.4 11/17/2021 08:30 AM    RDW 12.5 11/17/2021 08:30 AM     11/17/2021 08:30 AM     07/19/2021 05:50 AM    MPV 9.1 11/17/2021 08:30 AM      Lab Results   Component Value Date/Time     02/15/2022 09:33 AM    K 4.3 02/15/2022 09:33 AM    CO2 26 02/15/2022 09:33 AM    BUN 25 (H) 02/15/2022 09:33 AM    CREATININE 0.9 02/15/2022 09:33 AM    CALCIUM 9.4 02/15/2022 09:33 AM    LABGLOM >60 02/15/2022 09:33 AM    GFRAA >60 02/15/2022 09:33 AM      Lab Results   Component Value Date/Time    TSH 1.980 06/09/2022 12:09 PM    T4FREE 1.40 06/09/2022 12:09 PM    C0RMKIV 8.8 06/09/2022 12:09 PM    I6UOGBY 92.27 06/09/2022 12:09 PM    TSI <0.10 02/15/2022 09:34 AM    TPOABS 32.0 (H) 02/15/2022 09:33 AM     Lab Results   Component Value Date/Time    GLUCOSE 98 02/15/2022 09:33 AM     No results found for: LABA1C  No results found for: TRIG, HDL, LDLCALC, CHOL  Lab Results   Component Value Date/Time    VITD25 55 02/15/2022 09:33 AM       ASSESSMENT & RECOMMENDATIONS   Dennis Holder, a 43 y.o.-old female seen in for the following issues       Assessment:      Diagnosis Orders   1. Hypothyroidism, unspecified type  T4, Free    TSH    Vitamin B12    SYNTHROID 88 MCG tablet      2. Fatigue, unspecified type  Vitamin B12            Plan:     1.  Hypothyroidism, unspecified type   Last TFTs at goal  Mild improvement with brand Synthroid  Will continue brand Synthroid 88 mcg 1 tablet once daily  Patient advised to take on an empty stomach at least 1 hour before breakfast and without combination of other medications  Check TFTs today     2. Fatigue   Still an issue  Check B12  Check TFTs    I personally spent > 20 minutes reviewing  external notes from PCP and other patient's care team providers, and personally interpreted labs associated with the above diagnosis. I also ordered labs to further assess and manage the above addressed medical conditions. Return in about 6 months (around 4/13/2023). The above issues were reviewed with the patient who understood and agreed with the plan. Thank you for allowing us to participate in the care of this patient. Please do not hesitate to contact us with any additional questions. TOBIAS Adames [facility] - BEHAVIORAL HEALTH SERVICES Diabetes Care and Endocrinology   [address] [place] [address]   Phone: [phone]  Fax: [phone]  --------------------------------------------  An electronic signature was used to authenticate this note.  TOBIAS Adames on 10/13/2022 at 8:30 AM

## 2022-10-18 ENCOUNTER — TELEPHONE (OUTPATIENT)
Dept: ENDOCRINOLOGY | Age: 37
End: 2022-10-18

## 2022-10-18 NOTE — TELEPHONE ENCOUNTER
----- Message from TOBIAS Keyes sent at 10/17/2022  8:38 AM EDT -----  Please call patient and inform her thyroid function is normal.Continue brand Synthroid.   B12 is also normal

## 2022-12-13 SDOH — HEALTH STABILITY: PHYSICAL HEALTH: ON AVERAGE, HOW MANY MINUTES DO YOU ENGAGE IN EXERCISE AT THIS LEVEL?: 60 MIN

## 2022-12-13 SDOH — HEALTH STABILITY: PHYSICAL HEALTH: ON AVERAGE, HOW MANY DAYS PER WEEK DO YOU ENGAGE IN MODERATE TO STRENUOUS EXERCISE (LIKE A BRISK WALK)?: 5 DAYS

## 2022-12-14 ENCOUNTER — OFFICE VISIT (OUTPATIENT)
Dept: FAMILY MEDICINE CLINIC | Age: 37
End: 2022-12-14
Payer: COMMERCIAL

## 2022-12-14 VITALS
OXYGEN SATURATION: 97 % | WEIGHT: 132 LBS | BODY MASS INDEX: 26.61 KG/M2 | HEART RATE: 84 BPM | TEMPERATURE: 97 F | SYSTOLIC BLOOD PRESSURE: 112 MMHG | HEIGHT: 59 IN | DIASTOLIC BLOOD PRESSURE: 74 MMHG

## 2022-12-14 DIAGNOSIS — E03.8 HYPOTHYROIDISM DUE TO HASHIMOTO'S THYROIDITIS: ICD-10-CM

## 2022-12-14 DIAGNOSIS — Z00.00 ENCOUNTER FOR MEDICAL EXAMINATION TO ESTABLISH CARE: Primary | ICD-10-CM

## 2022-12-14 DIAGNOSIS — E06.3 HYPOTHYROIDISM DUE TO HASHIMOTO'S THYROIDITIS: ICD-10-CM

## 2022-12-14 DIAGNOSIS — Z00.00 ROUTINE PHYSICAL EXAMINATION: ICD-10-CM

## 2022-12-14 PROCEDURE — 99385 PREV VISIT NEW AGE 18-39: CPT | Performed by: STUDENT IN AN ORGANIZED HEALTH CARE EDUCATION/TRAINING PROGRAM

## 2022-12-14 PROCEDURE — G8482 FLU IMMUNIZE ORDER/ADMIN: HCPCS | Performed by: STUDENT IN AN ORGANIZED HEALTH CARE EDUCATION/TRAINING PROGRAM

## 2022-12-14 SDOH — ECONOMIC STABILITY: FOOD INSECURITY: WITHIN THE PAST 12 MONTHS, THE FOOD YOU BOUGHT JUST DIDN'T LAST AND YOU DIDN'T HAVE MONEY TO GET MORE.: NEVER TRUE

## 2022-12-14 SDOH — ECONOMIC STABILITY: FOOD INSECURITY: WITHIN THE PAST 12 MONTHS, YOU WORRIED THAT YOUR FOOD WOULD RUN OUT BEFORE YOU GOT MONEY TO BUY MORE.: NEVER TRUE

## 2022-12-14 ASSESSMENT — ENCOUNTER SYMPTOMS
EYE REDNESS: 0
DIARRHEA: 0
SORE THROAT: 0
BACK PAIN: 0
SINUS PAIN: 0
VOMITING: 0
ABDOMINAL PAIN: 0
SHORTNESS OF BREATH: 0
NAUSEA: 0
RHINORRHEA: 0
SINUS PRESSURE: 0
CONSTIPATION: 0
COUGH: 0
EYE PAIN: 0

## 2022-12-14 ASSESSMENT — PATIENT HEALTH QUESTIONNAIRE - PHQ9
2. FEELING DOWN, DEPRESSED OR HOPELESS: 0
SUM OF ALL RESPONSES TO PHQ9 QUESTIONS 1 & 2: 0
SUM OF ALL RESPONSES TO PHQ QUESTIONS 1-9: 0
1. LITTLE INTEREST OR PLEASURE IN DOING THINGS: 0
SUM OF ALL RESPONSES TO PHQ QUESTIONS 1-9: 0

## 2022-12-14 ASSESSMENT — SOCIAL DETERMINANTS OF HEALTH (SDOH): HOW HARD IS IT FOR YOU TO PAY FOR THE VERY BASICS LIKE FOOD, HOUSING, MEDICAL CARE, AND HEATING?: NOT HARD AT ALL

## 2022-12-14 NOTE — PROGRESS NOTES
2022    HPI  Chief Complaint   Patient presents with    New Patient       Sayda Hong is a 43 y.o. female who  has a past medical history of  delivery delivered, Hypothyroidism, and Hypothyroidism due to Hashimoto's thyroiditis. Annual exam:  Patient is here for routine yearly physical/preventative visit. Patient has no complaints or concerns today. Patient is  generally healthy. Chronic medical conditions are generally controlled. Most recent labs reviewed with patient and  are not remarkable. Health maintenance reviewed with patient and is  up to date. Overall doing well. Her only concern is that she continues to have irregular periods and she has the hypothyroidism. Her OB told her it was an endocrine problem and her endocrinologist told her that it was an OB problem. I recommended patient to call Dr. Barry Stephens office to see if she can get in earlier and continue to monitor her levels and see if there are any other labs that would need to be done. Review of Systems   Constitutional:  Negative for chills, fatigue and fever. HENT:  Negative for congestion, ear pain, postnasal drip, rhinorrhea, sinus pressure, sinus pain and sore throat. Eyes:  Negative for pain and redness. Respiratory:  Negative for cough and shortness of breath. Cardiovascular:  Negative for chest pain. Gastrointestinal:  Negative for abdominal pain, constipation, diarrhea, nausea and vomiting. Genitourinary:  Positive for menstrual problem. Negative for difficulty urinating and dysuria. Musculoskeletal:  Negative for back pain, myalgias and neck pain. Skin:  Negative for rash. Neurological:  Negative for dizziness, light-headedness and numbness. Psychiatric/Behavioral:  The patient is not nervous/anxious. Prior to Visit Medications    Medication Sig Taking?  Authorizing Provider   spironolactone (ALDACTONE) 100 MG tablet  Yes Historical Provider, MD   SYNTHROID 88 MCG tablet Take 1 tablet by mouth Daily Yes TOBIAS Keyes        No Known Allergies    Past Medical History:   Diagnosis Date     delivery delivered 2019    Hypothyroidism     Hypothyroidism due to Hashimoto's thyroiditis 2022       Past Surgical History:   Procedure Laterality Date    APPENDECTOMY       SECTION N/A 2019     SECTION performed by Jose F Matthews MD at [facility] L&D     SECTION N/A 2021     SECTION performed by Jose F Matthews MD at [facility] L&[facility] OR    LAPAROSCOPIC APPENDECTOMY  2017       Social History     Socioeconomic History    Marital status:      Spouse name: Not on file    Number of children: Not on file    Years of education: Not on file    Highest education level: Not on file   Occupational History    Not on file   Tobacco Use    Smoking status: Never    Smokeless tobacco: Never   Vaping Use    Vaping Use: Never used   Substance and Sexual Activity    Alcohol use: No    Drug use: No    Sexual activity: Yes     Partners: Male   Other Topics Concern    Not on file   Social History Narrative    Not on file     Social Determinants of Health     Financial Resource Strain: Low Risk     Difficulty of Paying Living Expenses: Not hard at all   Food Insecurity: No Food Insecurity    Worried About Running Out of Food in the Last Year: Never true    [address] in the Last Year: Never true   Transportation Needs: Not on file   Physical Activity: Sufficiently Active    Days of Exercise per Week: 5 days    Minutes of Exercise per Session: 60 min   Stress: Not on file   Social Connections: Not on file   Intimate Partner Violence: Not At Risk    Fear of Current or Ex-Partner: No    Emotionally Abused: No    Physically Abused: No    Sexually Abused: No   Housing Stability: Not on file        Family History   Problem Relation Age of Onset    Hypertension Father          Vitals:    22 0848   BP: 112/74   Pulse: 84   Temp: 97 °F (36.1 °C)   SpO2: 97%   Weight: 132 lb (59.9 kg)   Height: 4' 11\" (1.499 m)     Estimated body mass index is 26.66 kg/m² as calculated from the following:    Height as of this encounter: 4' 11\" (1.499 m). Weight as of this encounter: 132 lb (59.9 kg).     Most Recent Labs  CBC  Lab Results   Component Value Date/Time    WBC 6.8 11/17/2021 08:30 AM    WBC 10.0 07/19/2021 05:50 AM    WBC 10.3 04/26/2021 11:00 AM    WBC 10.1 01/18/2021 09:40 AM    WBC 13.3 01/23/2019 09:05 PM    WBC 12.2 02/22/2017 05:53 AM    RBC 5.12 11/17/2021 08:30 AM    RBC 3.84 07/19/2021 05:50 AM    RBC 3.70 04/26/2021 11:00 AM    RBC 4.29 01/18/2021 09:40 AM    HGB 14.3 11/17/2021 08:30 AM    HGB 10.0 07/20/2021 05:55 AM    HGB 11.3 07/19/2021 05:50 AM    HCT 44.0 11/17/2021 08:30 AM    HCT 30.2 07/20/2021 05:55 AM    HCT 34.3 07/19/2021 05:50 AM    MCV 86 11/17/2021 08:30 AM    MCV 89.3 07/19/2021 05:50 AM    MCV 93 04/26/2021 11:00 AM     11/17/2021 08:30 AM     07/19/2021 05:50 AM     04/26/2021 11:00 AM     01/18/2021 09:40 AM     01/23/2019 09:05 PM     02/22/2017 05:53 AM      CMP  Lab Results   Component Value Date/Time     02/15/2022 09:33 AM     02/22/2017 04:13 AM     02/21/2017 10:24 AM    K 4.3 02/15/2022 09:33 AM    K 4.5 02/22/2017 04:13 AM    K 4.5 02/21/2017 10:24 AM     02/15/2022 09:33 AM     02/22/2017 04:13 AM     02/21/2017 10:24 AM    CO2 26 02/15/2022 09:33 AM    CO2 23 02/22/2017 04:13 AM    CO2 27 02/21/2017 10:24 AM    ANIONGAP 11 02/15/2022 09:33 AM    ANIONGAP 12 02/22/2017 04:13 AM    ANIONGAP 10 02/21/2017 10:24 AM    GLUCOSE 98 02/15/2022 09:33 AM    GLUCOSE 120 02/22/2017 04:13 AM    GLUCOSE 121 02/21/2017 10:24 AM    BUN 25 02/15/2022 09:33 AM    BUN 11 02/22/2017 04:13 AM    BUN 19 02/21/2017 10:24 AM    CREATININE 0.9 02/15/2022 09:33 AM    CREATININE 0.8 02/22/2017 04:13 AM    CREATININE 0.8 02/21/2017 10:24 AM    LABGLOM >60 02/15/2022 09:33 AM    LABGLOM >60 02/22/2017 04:13 AM    LABGLOM >60 02/21/2017 10:24 AM    GFRAA >60 02/15/2022 09:33 AM    GFRAA >60 02/22/2017 04:13 AM    GFRAA >60 02/21/2017 10:24 AM    CALCIUM 9.4 02/15/2022 09:33 AM    CALCIUM 8.8 02/22/2017 04:13 AM    CALCIUM 9.5 02/21/2017 10:24 AM    PROT 7.5 02/21/2017 10:24 AM    LABALBU 4.2 02/21/2017 10:24 AM    BILITOT 1.4 02/21/2017 10:24 AM    ALKPHOS 57 02/21/2017 10:24 AM    AST 19 02/21/2017 10:24 AM    ALT 18 02/21/2017 10:24 AM     TSH  Lab Results   Component Value Date/Time    TSH 1.200 10/13/2022 08:30 AM    TSH 1.980 06/09/2022 12:09 PM    TSH 6.890 04/13/2022 12:07 PM     FREET4  Lab Results   Component Value Date/Time    H5ZXVBU 8.8 06/09/2022 12:09 PM    T0KGZIL 4.1 02/15/2022 09:33 AM     VITAMIN D  Lab Results   Component Value Date/Time    VITD25 55 02/15/2022 09:33 AM     UA  Lab Results   Component Value Date/Time    COLORU Yellow 02/21/2017 10:02 AM    CLARITYU Clear 02/21/2017 10:02 AM    GLUCOSEU neg 07/16/2021 07:14 AM    GLUCOSEU negative 07/08/2021 10:23 AM    GLUCOSEU negative 06/24/2021 08:36 AM    GLUCOSEU Negative 02/21/2017 10:02 AM    BILIRUBINUR neg 04/26/2021 10:06 AM    BILIRUBINUR neg 12/21/2020 09:52 AM    BILIRUBINUR neg 03/07/2019 10:54 AM    KETUA neg 04/26/2021 10:06 AM    KETUA neg 12/21/2020 09:52 AM    KETUA neg 03/07/2019 10:54 AM    KETUA Negative 02/21/2017 10:02 AM    SPECGRAV <1.005 04/26/2021 10:06 AM    SPECGRAV 1.015 12/21/2020 09:52 AM    SPECGRAV 1.010 03/07/2019 10:54 AM    SPECGRAV 1.010 02/21/2017 10:02 AM    BLOODU neg 04/26/2021 10:06 AM    BLOODU trace 12/21/2020 09:52 AM    BLOODU neg 03/07/2019 10:54 AM    BLOODU TRACE-INTACT 02/21/2017 10:02 AM    PHUR 6.0 04/26/2021 10:06 AM    PHUR 5.5 12/21/2020 09:52 AM    PHUR 6.0 03/07/2019 10:54 AM    PHUR 5.0 02/21/2017 10:02 AM    PROTEINU Negative 07/16/2021 07:15 AM    PROTEINU Negative 07/08/2021 10:23 AM    PROTEINU Negative 06/24/2021 08:36 AM UROBILINOGEN 0.2 02/21/2017 10:02 AM    NITRU Negative 02/21/2017 10:02 AM    LEUKOCYTESUR small 04/26/2021 10:06 AM    LEUKOCYTESUR neg 12/21/2020 09:52 AM    LEUKOCYTESUR neg 03/07/2019 10:54 AM    LEUKOCYTESUR Negative 02/21/2017 10:02 AM       Physical Exam  Vitals and nursing note reviewed. Constitutional:       Appearance: Normal appearance. HENT:      Head: Normocephalic and atraumatic. Right Ear: External ear normal.      Left Ear: External ear normal.   Eyes:      Extraocular Movements: Extraocular movements intact. Conjunctiva/sclera: Conjunctivae normal.      Pupils: Pupils are equal, round, and reactive to light. Cardiovascular:      Rate and Rhythm: Normal rate and regular rhythm. Pulses: Normal pulses. Heart sounds: Normal heart sounds. Pulmonary:      Effort: Pulmonary effort is normal.      Breath sounds: Normal breath sounds. Abdominal:      General: Bowel sounds are normal.      Palpations: Abdomen is soft. Musculoskeletal:         General: Normal range of motion. Cervical back: Normal range of motion and neck supple. Skin:     General: Skin is warm and dry. Capillary Refill: Capillary refill takes less than 2 seconds. Neurological:      General: No focal deficit present. Mental Status: She is alert and oriented to person, place, and time. Psychiatric:         Mood and Affect: Mood normal.         Behavior: Behavior normal.         Thought Content: Thought content normal.       ASSESSMENT/PLAN:  Judie was seen today for new patient. Diagnoses and all orders for this visit:    Encounter for medical examination to establish care    Routine physical examination    Hypothyroidism due to Hashimoto's thyroiditis     Patient to let us know if she would need a referral for another endocrinologist or not     As above.   Call or go to the nearest ED immediately if symptoms worsen or persist.  Educational materials and/or home exercises printed for patient's review and were included in patient instructions on his/her After Visit Summary and given to patient at the end of visit. Counseled regarding above diagnosis, including possible risks and complications,  especially if left uncontrolled. Counseled regarding the possible side effects, risks, benefits and alternatives to treatment; patient and/or guardian verbalizes understanding, agrees, feels comfortable with and wishes to proceed with above treatment plan. Advised patient to call with any new medication issues, and read all Rx info from pharmacy to assure aware of all possible risks and side effects of medication before taking. Reviewed age and gender appropriate health screening exams and vaccinations. Advised patient regarding importance of keeping up with recommended health maintenance and to schedule as soon as possible if overdue, as this is important in assessing for undiagnosed pathology, especially cancer, as well as protecting against potentially harmful/life threatening disease. Patient and/or guardian verbalizes understanding and agrees with above counseling, assessment and plan. All questions answered. Return in about 1 year (around 12/14/2023), or if symptoms worsen or fail to improve, for annual.    An  electronic signature was used to authenticate this note. --Bora Noel DO on 12/14/2022 at 9:22 AM    This document was prepared at least partially through the use of voice recognition software. Although effort is taken to assure the accuracy of this document, it is possible that grammatical, syntax,  or spelling errors may occur.

## 2023-01-06 ENCOUNTER — HOSPITAL ENCOUNTER (OUTPATIENT)
Dept: ULTRASOUND IMAGING | Age: 38
End: 2023-01-06
Payer: COMMERCIAL

## 2023-01-06 ENCOUNTER — OFFICE VISIT (OUTPATIENT)
Dept: FAMILY MEDICINE CLINIC | Age: 38
End: 2023-01-06
Payer: COMMERCIAL

## 2023-01-06 VITALS
OXYGEN SATURATION: 97 % | TEMPERATURE: 97 F | RESPIRATION RATE: 16 BRPM | HEART RATE: 72 BPM | DIASTOLIC BLOOD PRESSURE: 68 MMHG | SYSTOLIC BLOOD PRESSURE: 122 MMHG | HEIGHT: 59 IN | BODY MASS INDEX: 26.81 KG/M2 | WEIGHT: 133 LBS

## 2023-01-06 DIAGNOSIS — R10.11 RIGHT UPPER QUADRANT PAIN: ICD-10-CM

## 2023-01-06 DIAGNOSIS — R10.11 RIGHT UPPER QUADRANT PAIN: Primary | ICD-10-CM

## 2023-01-06 PROCEDURE — 99213 OFFICE O/P EST LOW 20 MIN: CPT | Performed by: STUDENT IN AN ORGANIZED HEALTH CARE EDUCATION/TRAINING PROGRAM

## 2023-01-06 PROCEDURE — G8482 FLU IMMUNIZE ORDER/ADMIN: HCPCS | Performed by: STUDENT IN AN ORGANIZED HEALTH CARE EDUCATION/TRAINING PROGRAM

## 2023-01-06 PROCEDURE — G8427 DOCREV CUR MEDS BY ELIG CLIN: HCPCS | Performed by: STUDENT IN AN ORGANIZED HEALTH CARE EDUCATION/TRAINING PROGRAM

## 2023-01-06 PROCEDURE — 1036F TOBACCO NON-USER: CPT | Performed by: STUDENT IN AN ORGANIZED HEALTH CARE EDUCATION/TRAINING PROGRAM

## 2023-01-06 PROCEDURE — G8417 CALC BMI ABV UP PARAM F/U: HCPCS | Performed by: STUDENT IN AN ORGANIZED HEALTH CARE EDUCATION/TRAINING PROGRAM

## 2023-01-06 PROCEDURE — 76700 US EXAM ABDOM COMPLETE: CPT

## 2023-01-06 ASSESSMENT — ENCOUNTER SYMPTOMS
ABDOMINAL PAIN: 1
SINUS PRESSURE: 0
EYE REDNESS: 0
NAUSEA: 0
VOMITING: 0
CONSTIPATION: 0
SINUS PAIN: 0
SHORTNESS OF BREATH: 0
DIARRHEA: 0
RHINORRHEA: 0
BACK PAIN: 0
SORE THROAT: 0
COUGH: 0
EYE PAIN: 0

## 2023-01-06 ASSESSMENT — PATIENT HEALTH QUESTIONNAIRE - PHQ9
SUM OF ALL RESPONSES TO PHQ9 QUESTIONS 1 & 2: 0
2. FEELING DOWN, DEPRESSED OR HOPELESS: 0
SUM OF ALL RESPONSES TO PHQ QUESTIONS 1-9: 0
1. LITTLE INTEREST OR PLEASURE IN DOING THINGS: 0
SUM OF ALL RESPONSES TO PHQ QUESTIONS 1-9: 0

## 2023-01-06 ASSESSMENT — LIFESTYLE VARIABLES
HOW OFTEN DO YOU HAVE A DRINK CONTAINING ALCOHOL: MONTHLY OR LESS
HOW MANY STANDARD DRINKS CONTAINING ALCOHOL DO YOU HAVE ON A TYPICAL DAY: 1 OR 2

## 2023-01-06 NOTE — PROGRESS NOTES
2023    [facility]  Chief Complaint   Patient presents with    Abdominal Pain     Right upper and middle abdominal quadrant pain x 3 weeks        Ying Odonnell is a 42 y.o. female who  has a past medical history of  delivery delivered, Hypothyroidism, and Hypothyroidism due to Hashimoto's thyroiditis. Abdominal Pain  Patient complains of abdominal pain. The pain is described as aching and burning, and is 6/10 in intensity. The patient is experiencing RUQ pain with radiation to the epigastric region. Onset was 3 weeks ago. Symptoms have been gradually worsening. Aggravating factors: activity, eating, and fatty foods. Alleviating factors: time. Associated symptoms: constipation and nausea. The patient denies dysuria, fever, frequency, hematochezia, hematuria, and vomiting. Review of Systems   Constitutional:  Negative for chills, fatigue and fever. HENT:  Negative for congestion, ear pain, postnasal drip, rhinorrhea, sinus pressure, sinus pain and sore throat. Eyes:  Negative for pain and redness. Respiratory:  Negative for cough and shortness of breath. Cardiovascular:  Negative for chest pain. Gastrointestinal:  Positive for abdominal pain. Negative for constipation, diarrhea, nausea and vomiting. Genitourinary:  Negative for difficulty urinating and dysuria. Musculoskeletal:  Negative for back pain, myalgias and neck pain. Skin:  Negative for rash. Neurological:  Negative for dizziness, light-headedness and numbness. Psychiatric/Behavioral:  The patient is not nervous/anxious. Prior to Visit Medications    Medication Sig Taking?  Authorizing Provider   spironolactone (ALDACTONE) 100 MG tablet  Yes Historical Provider, MD   SYNTHROID 88 MCG tablet Take 1 tablet by mouth Daily Yes TOBIAS David - CNS        No Known Allergies    Past Medical History:   Diagnosis Date     delivery delivered 2019    Hypothyroidism     Hypothyroidism due to Hashimoto's thyroiditis 2022       Past Surgical History:   Procedure Laterality Date    APPENDECTOMY       SECTION N/A 2019     SECTION performed by Anamika Travis MD at [facility] L&D     SECTION N/A 2021     SECTION performed by Anamika Travis MD at [facility] L&[facility] OR    LAPAROSCOPIC APPENDECTOMY  2017       Social History     Socioeconomic History    Marital status:      Spouse name: Not on file    Number of children: Not on file    Years of education: Not on file    Highest education level: Not on file   Occupational History    Not on file   Tobacco Use    Smoking status: Never    Smokeless tobacco: Never   Vaping Use    Vaping Use: Never used   Substance and Sexual Activity    Alcohol use: No    Drug use: No    Sexual activity: Yes     Partners: Male   Other Topics Concern    Not on file   Social History Narrative    Not on file     Social Determinants of Health     Financial Resource Strain: Low Risk     Difficulty of Paying Living Expenses: Not hard at all   Food Insecurity: No Food Insecurity    Worried About Running Out of Food in the Last Year: Never true    [address] [place] [address] Place of Food in the Last Year: Never true   Transportation Needs: Not on file   Physical Activity: Sufficiently Active    Days of Exercise per Week: 5 days    Minutes of Exercise per Session: 60 min   Stress: Not on file   Social Connections: Not on file   Intimate Partner Violence: Not At Risk    Fear of Current or Ex-Partner: No    Emotionally Abused: No    Physically Abused: No    Sexually Abused: No   Housing Stability: Not on file        Family History   Problem Relation Age of Onset    Hypertension Father            Vitals:    23 1020   BP: 122/68   Pulse: 72   Resp: 16   Temp: 97 °F (36.1 °C)   TempSrc: Temporal   SpO2: 97%   Weight: 133 lb (60.3 kg)   Height: 4' 11\" (1.499 m)     Estimated body mass index is 26.86 kg/m² as calculated from the following:    Height as of this encounter: 4' 11\" (1.499 m). Weight as of this encounter: 133 lb (60.3 kg).     Most Recent Labs  CBC  Lab Results   Component Value Date/Time    WBC 6.8 11/17/2021 08:30 AM    WBC 10.0 07/19/2021 05:50 AM    WBC 10.3 04/26/2021 11:00 AM    WBC 10.1 01/18/2021 09:40 AM    WBC 13.3 01/23/2019 09:05 PM    WBC 12.2 02/22/2017 05:53 AM    RBC 5.12 11/17/2021 08:30 AM    RBC 3.84 07/19/2021 05:50 AM    RBC 3.70 04/26/2021 11:00 AM    RBC 4.29 01/18/2021 09:40 AM    HGB 14.3 11/17/2021 08:30 AM    HGB 10.0 07/20/2021 05:55 AM    HGB 11.3 07/19/2021 05:50 AM    HCT 44.0 11/17/2021 08:30 AM    HCT 30.2 07/20/2021 05:55 AM    HCT 34.3 07/19/2021 05:50 AM    MCV 86 11/17/2021 08:30 AM    MCV 89.3 07/19/2021 05:50 AM    MCV 93 04/26/2021 11:00 AM     11/17/2021 08:30 AM     07/19/2021 05:50 AM     04/26/2021 11:00 AM     01/18/2021 09:40 AM     01/23/2019 09:05 PM     02/22/2017 05:53 AM      CMP  Lab Results   Component Value Date/Time     02/15/2022 09:33 AM     02/22/2017 04:13 AM     02/21/2017 10:24 AM    K 4.3 02/15/2022 09:33 AM    K 4.5 02/22/2017 04:13 AM    K 4.5 02/21/2017 10:24 AM     02/15/2022 09:33 AM     02/22/2017 04:13 AM     02/21/2017 10:24 AM    CO2 26 02/15/2022 09:33 AM    CO2 23 02/22/2017 04:13 AM    CO2 27 02/21/2017 10:24 AM    ANIONGAP 11 02/15/2022 09:33 AM    ANIONGAP 12 02/22/2017 04:13 AM    ANIONGAP 10 02/21/2017 10:24 AM    GLUCOSE 98 02/15/2022 09:33 AM    GLUCOSE 120 02/22/2017 04:13 AM    GLUCOSE 121 02/21/2017 10:24 AM    BUN 25 02/15/2022 09:33 AM    BUN 11 02/22/2017 04:13 AM    BUN 19 02/21/2017 10:24 AM    CREATININE 0.9 02/15/2022 09:33 AM    CREATININE 0.8 02/22/2017 04:13 AM    CREATININE 0.8 02/21/2017 10:24 AM    LABGLOM >60 02/15/2022 09:33 AM    LABGLOM >60 02/22/2017 04:13 AM    LABGLOM >60 02/21/2017 10:24 AM    GFRAA >60 02/15/2022 09:33 AM    GFRAA >60 02/22/2017 04:13 AM    GFRAA >60 02/21/2017 10:24 AM CALCIUM 9.4 02/15/2022 09:33 AM    CALCIUM 8.8 02/22/2017 04:13 AM    CALCIUM 9.5 02/21/2017 10:24 AM    PROT 7.5 02/21/2017 10:24 AM    LABALBU 4.2 02/21/2017 10:24 AM    BILITOT 1.4 02/21/2017 10:24 AM    ALKPHOS 57 02/21/2017 10:24 AM    AST 19 02/21/2017 10:24 AM    ALT 18 02/21/2017 10:24 AM     A1C  No results found for: LABA1C  TSH  Lab Results   Component Value Date/Time    TSH 1.200 10/13/2022 08:30 AM    TSH 1.980 06/09/2022 12:09 PM    TSH 6.890 04/13/2022 12:07 PM     FREET4  Lab Results   Component Value Date/Time    G8TRJJC 8.8 06/09/2022 12:09 PM    F7UDDUB 4.1 02/15/2022 09:33 AM     LIPID  No results found for: CHOL, HDL, LDLCALC, TRIG, CHOLHDLRATIO, VLDL  VITAMIN D  Lab Results   Component Value Date/Time    VITD25 55 02/15/2022 09:33 AM     MAGNESIUM  No results found for: MG   PHOS  No results found for: PHOS   LISA   No results found for: LISA  RHEUMATOID FACTOR  No results found for: RF   HEPATITIS C  No results found for: HCVABI  HIV  No results found for: QMP2LNA, HIV1QT  UA  Lab Results   Component Value Date/Time    COLORU Yellow 02/21/2017 10:02 AM    CLARITYU Clear 02/21/2017 10:02 AM    GLUCOSEU neg 07/16/2021 07:14 AM    GLUCOSEU negative 07/08/2021 10:23 AM    GLUCOSEU negative 06/24/2021 08:36 AM    GLUCOSEU Negative 02/21/2017 10:02 AM    BILIRUBINUR neg 04/26/2021 10:06 AM    BILIRUBINUR neg 12/21/2020 09:52 AM    BILIRUBINUR neg 03/07/2019 10:54 AM    KETUA neg 04/26/2021 10:06 AM    KETUA neg 12/21/2020 09:52 AM    KETUA neg 03/07/2019 10:54 AM    KETUA Negative 02/21/2017 10:02 AM    SPECGRAV <1.005 04/26/2021 10:06 AM    SPECGRAV 1.015 12/21/2020 09:52 AM    SPECGRAV 1.010 03/07/2019 10:54 AM    SPECGRAV 1.010 02/21/2017 10:02 AM    BLOODU neg 04/26/2021 10:06 AM    BLOODU trace 12/21/2020 09:52 AM    BLOODU neg 03/07/2019 10:54 AM    BLOODU TRACE-INTACT 02/21/2017 10:02 AM    PHUR 6.0 04/26/2021 10:06 AM    PHUR 5.5 12/21/2020 09:52 AM    PHUR 6.0 03/07/2019 10:54 AM    PHUR 5.0 02/21/2017 10:02 AM    PROTEINU Negative 07/16/2021 07:15 AM    PROTEINU Negative 07/08/2021 10:23 AM    PROTEINU Negative 06/24/2021 08:36 AM    UROBILINOGEN 0.2 02/21/2017 10:02 AM    NITRU Negative 02/21/2017 10:02 AM    LEUKOCYTESUR small 04/26/2021 10:06 AM    LEUKOCYTESUR neg 12/21/2020 09:52 AM    LEUKOCYTESUR neg 03/07/2019 10:54 AM    LEUKOCYTESUR Negative 02/21/2017 10:02 AM     Urine Micro/Albumin Ratio  No results found for: MALBCR     Physical Exam  Vitals and nursing note reviewed. Constitutional:       Appearance: Normal appearance. HENT:      Head: Normocephalic and atraumatic. Right Ear: External ear normal.      Left Ear: External ear normal.   Eyes:      Extraocular Movements: Extraocular movements intact. Conjunctiva/sclera: Conjunctivae normal.      Pupils: Pupils are equal, round, and reactive to light. Cardiovascular:      Rate and Rhythm: Normal rate and regular rhythm. Pulses: Normal pulses. Heart sounds: Normal heart sounds. Pulmonary:      Effort: Pulmonary effort is normal.      Breath sounds: Normal breath sounds. Abdominal:      General: Bowel sounds are normal.      Palpations: Abdomen is soft. Tenderness: There is abdominal tenderness (RUQ). There is no guarding or rebound. Musculoskeletal:         General: Normal range of motion. Cervical back: Normal range of motion and neck supple. Skin:     General: Skin is warm and dry. Capillary Refill: Capillary refill takes less than 2 seconds. Neurological:      General: No focal deficit present. Mental Status: She is alert and oriented to person, place, and time. Psychiatric:         Mood and Affect: Mood normal.         Behavior: Behavior normal.         Thought Content: Thought content normal.       ASSESSMENT/PLAN:  Dior Lee was seen today for abdominal pain.     Diagnoses and all orders for this visit:    Right upper quadrant pain  -     US ABDOMEN COMPLETE; Future     US ordered, we got it scheduled for patient tonight at 615. They will be calling my cell phone with results. As above. Call or go to the nearest ED immediately if symptoms worsen or persist.  Educational materials and/or home exercises printed for patient's review and were included in patient instructions on his/her After Visit Summary and given to patient at the end of visit. Counseled regarding above diagnosis, including possible risks and complications,  especially if left uncontrolled. Counseled regarding the possible side effects, risks, benefits and alternatives to treatment; patient and/or guardian verbalizes understanding, agrees, feels comfortable with and wishes to proceed with above treatment plan. Advised patient to call with any new medication issues, and read all Rx info from pharmacy to assure aware of all possible risks and side effects of medication before taking. Reviewed age and gender appropriate health screening exams and vaccinations. Advised patient regarding importance of keeping up with recommended health maintenance and to schedule as soon as possible if overdue, as this is important in assessing for undiagnosed pathology, especially cancer, as well as protecting against potentially harmful/life threatening disease. Patient and/or guardian verbalizes understanding and agrees with above counseling, assessment and plan. All questions answered. Return if symptoms worsen or fail to improve. An  electronic signature was used to authenticate this note. --Bora Orellana DO on 1/6/2023 at 10:49 AM    This document was prepared at least partially through the use of voice recognition software. Although effort is taken to assure the accuracy of this document, it is possible that grammatical, syntax,  or spelling errors may occur.

## 2023-01-28 DIAGNOSIS — E03.9 HYPOTHYROIDISM, UNSPECIFIED TYPE: ICD-10-CM

## 2023-01-30 RX ORDER — LEVOTHYROXINE SODIUM 88 MCG
TABLET ORAL
Qty: 90 TABLET | Refills: 1 | Status: SHIPPED | OUTPATIENT
Start: 2023-01-30

## 2023-02-27 ENCOUNTER — TELEPHONE (OUTPATIENT)
Dept: ENDOCRINOLOGY | Age: 38
End: 2023-02-27

## 2023-03-23 ENCOUNTER — OFFICE VISIT (OUTPATIENT)
Dept: FAMILY MEDICINE CLINIC | Age: 38
End: 2023-03-23
Payer: COMMERCIAL

## 2023-03-23 VITALS
HEIGHT: 59 IN | HEART RATE: 56 BPM | DIASTOLIC BLOOD PRESSURE: 68 MMHG | OXYGEN SATURATION: 99 % | RESPIRATION RATE: 18 BRPM | BODY MASS INDEX: 27.62 KG/M2 | WEIGHT: 137 LBS | TEMPERATURE: 98.1 F | SYSTOLIC BLOOD PRESSURE: 110 MMHG

## 2023-03-23 DIAGNOSIS — M25.532 BILATERAL WRIST PAIN: ICD-10-CM

## 2023-03-23 DIAGNOSIS — E06.3 HYPOTHYROIDISM DUE TO HASHIMOTO'S THYROIDITIS: ICD-10-CM

## 2023-03-23 DIAGNOSIS — E03.8 HYPOTHYROIDISM DUE TO HASHIMOTO'S THYROIDITIS: Primary | ICD-10-CM

## 2023-03-23 DIAGNOSIS — I73.00 RAYNAUD'S DISEASE WITHOUT GANGRENE: ICD-10-CM

## 2023-03-23 DIAGNOSIS — D50.9 IRON DEFICIENCY ANEMIA, UNSPECIFIED IRON DEFICIENCY ANEMIA TYPE: ICD-10-CM

## 2023-03-23 DIAGNOSIS — M25.531 BILATERAL WRIST PAIN: ICD-10-CM

## 2023-03-23 DIAGNOSIS — R53.82 CHRONIC FATIGUE: ICD-10-CM

## 2023-03-23 DIAGNOSIS — E03.8 HYPOTHYROIDISM DUE TO HASHIMOTO'S THYROIDITIS: ICD-10-CM

## 2023-03-23 DIAGNOSIS — E06.3 HYPOTHYROIDISM DUE TO HASHIMOTO'S THYROIDITIS: Primary | ICD-10-CM

## 2023-03-23 PROBLEM — Z86.16 PERSONAL HISTORY OF COVID-19: Status: RESOLVED | Noted: 2021-04-26 | Resolved: 2023-03-23

## 2023-03-23 PROBLEM — G89.18 ACUTE POSTOPERATIVE PAIN: Status: RESOLVED | Noted: 2021-07-21 | Resolved: 2023-03-23

## 2023-03-23 PROBLEM — R30.0 DYSURIA: Status: RESOLVED | Noted: 2020-12-21 | Resolved: 2023-03-23

## 2023-03-23 PROBLEM — Z3A.38 38 WEEKS GESTATION OF PREGNANCY: Status: RESOLVED | Noted: 2021-07-19 | Resolved: 2023-03-23

## 2023-03-23 LAB
CRP SERPL HS-MCNC: <0.3 MG/DL (ref 0–0.4)
ERYTHROCYTE [SEDIMENTATION RATE] IN BLOOD BY WESTERGREN METHOD: 37 MM/HR (ref 0–20)
FERRITIN SERPL-MCNC: 22 NG/ML
IRON SATN MFR SERPL: 27 % (ref 15–50)
IRON SERPL-MCNC: 115 MCG/DL (ref 37–145)
TIBC SERPL-MCNC: 422 MCG/DL (ref 250–450)
TSH SERPL-MCNC: 1.48 UIU/ML (ref 0.27–4.2)
VIT B12 SERPL-MCNC: 716 PG/ML (ref 211–946)

## 2023-03-23 PROCEDURE — G8427 DOCREV CUR MEDS BY ELIG CLIN: HCPCS | Performed by: STUDENT IN AN ORGANIZED HEALTH CARE EDUCATION/TRAINING PROGRAM

## 2023-03-23 PROCEDURE — 1036F TOBACCO NON-USER: CPT | Performed by: STUDENT IN AN ORGANIZED HEALTH CARE EDUCATION/TRAINING PROGRAM

## 2023-03-23 PROCEDURE — G8482 FLU IMMUNIZE ORDER/ADMIN: HCPCS | Performed by: STUDENT IN AN ORGANIZED HEALTH CARE EDUCATION/TRAINING PROGRAM

## 2023-03-23 PROCEDURE — 99214 OFFICE O/P EST MOD 30 MIN: CPT | Performed by: STUDENT IN AN ORGANIZED HEALTH CARE EDUCATION/TRAINING PROGRAM

## 2023-03-23 PROCEDURE — G8417 CALC BMI ABV UP PARAM F/U: HCPCS | Performed by: STUDENT IN AN ORGANIZED HEALTH CARE EDUCATION/TRAINING PROGRAM

## 2023-03-23 SDOH — ECONOMIC STABILITY: INCOME INSECURITY: HOW HARD IS IT FOR YOU TO PAY FOR THE VERY BASICS LIKE FOOD, HOUSING, MEDICAL CARE, AND HEATING?: NOT HARD AT ALL

## 2023-03-23 SDOH — ECONOMIC STABILITY: FOOD INSECURITY: WITHIN THE PAST 12 MONTHS, YOU WORRIED THAT YOUR FOOD WOULD RUN OUT BEFORE YOU GOT MONEY TO BUY MORE.: NEVER TRUE

## 2023-03-23 SDOH — ECONOMIC STABILITY: HOUSING INSECURITY
IN THE LAST 12 MONTHS, WAS THERE A TIME WHEN YOU DID NOT HAVE A STEADY PLACE TO SLEEP OR SLEPT IN A SHELTER (INCLUDING NOW)?: NO

## 2023-03-23 SDOH — ECONOMIC STABILITY: FOOD INSECURITY: WITHIN THE PAST 12 MONTHS, THE FOOD YOU BOUGHT JUST DIDN'T LAST AND YOU DIDN'T HAVE MONEY TO GET MORE.: NEVER TRUE

## 2023-03-23 ASSESSMENT — ENCOUNTER SYMPTOMS
CONSTIPATION: 0
VOMITING: 0
SORE THROAT: 0
DIARRHEA: 0
SINUS PRESSURE: 0
EYE REDNESS: 0
EYE PAIN: 0
BACK PAIN: 0
NAUSEA: 0
SINUS PAIN: 0
COUGH: 0
SHORTNESS OF BREATH: 0
ABDOMINAL PAIN: 0
RHINORRHEA: 0

## 2023-03-23 ASSESSMENT — PATIENT HEALTH QUESTIONNAIRE - PHQ9
1. LITTLE INTEREST OR PLEASURE IN DOING THINGS: 0
SUM OF ALL RESPONSES TO PHQ QUESTIONS 1-9: 0
2. FEELING DOWN, DEPRESSED OR HOPELESS: 0
SUM OF ALL RESPONSES TO PHQ9 QUESTIONS 1 & 2: 0

## 2023-03-23 NOTE — PROGRESS NOTES
3/23/2023    [facility]  Chief Complaint   Patient presents with    Swelling    Wrist Pain    Fatigue       Russ Weir is a 42 y.o. female who  has a past medical history of 38 weeks gestation of pregnancy,  delivery delivered, Dysuria, Hypothyroidism, Hypothyroidism due to Hashimoto's thyroiditis, and Personal history of COVID-19. Wrist Pain  Patient complains of bilateral wrist pain and swelling. The pain is severe, worsens with movement, and some relief by rest.  There is associated numbness, tingling, weakness in the bilateral hand, fingers, and thumb. Pain has been present for several weeks. There is not a history of injury, strain. She does have history of Raynaud's and this is why she contacted her endocrinologist but they said that there was nothing more for them to do for her at this time. She just wants to make sure that there is nothing else going on like any other autoimmune disorder. Patient is also continuing to have extreme fatigue. The thyroid medication does not seem to help. Review of Systems   Constitutional:  Positive for fatigue. Negative for chills and fever. HENT:  Negative for congestion, ear pain, postnasal drip, rhinorrhea, sinus pressure, sinus pain and sore throat. Eyes:  Negative for pain and redness. Respiratory:  Negative for cough and shortness of breath. Cardiovascular:  Negative for chest pain. Gastrointestinal:  Negative for abdominal pain, constipation, diarrhea, nausea and vomiting. Genitourinary:  Negative for difficulty urinating and dysuria. Musculoskeletal:  Positive for arthralgias and joint swelling. Negative for back pain, myalgias and neck pain. Skin:  Negative for rash. Neurological:  Negative for dizziness, light-headedness and numbness. Psychiatric/Behavioral:  The patient is not nervous/anxious. Prior to Visit Medications    Medication Sig Taking?  Authorizing Provider   SYNTHROID 88 MCG tablet TAKE 1 TABLET BY Isreali

## 2023-03-24 LAB
ANA SER QL: NEGATIVE
ANTI DNA DOUBLE STRANDED: NEGATIVE

## 2023-03-25 LAB
HISTONE IGG SER IA-ACNC: 1.3 UNITS (ref 0–0.9)
RHEUMATOID FACT SER NEPH-ACNC: <10 IU/ML (ref 0–13)

## 2023-03-26 LAB
MYELOPEROXIDASE AB SER-ACNC: 0 AU/ML (ref 0–19)
PROTEINASE3 AB SER-ACNC: 0 AU/ML (ref 0–19)

## 2023-03-27 LAB — CCP AB SER IA-ACNC: 1.8 U/ML (ref 0–7)

## 2023-03-28 DIAGNOSIS — R70.0 ELEVATED ERYTHROCYTE SEDIMENTATION RATE: Primary | ICD-10-CM

## 2023-03-28 DIAGNOSIS — I73.00 RAYNAUD'S DISEASE WITHOUT GANGRENE: ICD-10-CM

## 2023-03-28 DIAGNOSIS — M25.531 BILATERAL WRIST PAIN: ICD-10-CM

## 2023-03-28 DIAGNOSIS — M25.532 BILATERAL WRIST PAIN: ICD-10-CM

## 2023-03-28 RX ORDER — PREDNISONE 20 MG/1
20 TABLET ORAL 2 TIMES DAILY
Qty: 10 TABLET | Refills: 0 | Status: SHIPPED | OUTPATIENT
Start: 2023-03-28 | End: 2023-04-02

## 2023-04-20 ENCOUNTER — OFFICE VISIT (OUTPATIENT)
Dept: RHEUMATOLOGY | Age: 38
End: 2023-04-20
Payer: COMMERCIAL

## 2023-04-20 ENCOUNTER — OFFICE VISIT (OUTPATIENT)
Dept: ENDOCRINOLOGY | Age: 38
End: 2023-04-20
Payer: COMMERCIAL

## 2023-04-20 VITALS
HEART RATE: 66 BPM | HEIGHT: 59 IN | WEIGHT: 138 LBS | DIASTOLIC BLOOD PRESSURE: 77 MMHG | BODY MASS INDEX: 27.82 KG/M2 | OXYGEN SATURATION: 100 % | SYSTOLIC BLOOD PRESSURE: 116 MMHG

## 2023-04-20 VITALS — WEIGHT: 138 LBS | HEIGHT: 59 IN | BODY MASS INDEX: 27.82 KG/M2

## 2023-04-20 DIAGNOSIS — E55.9 VITAMIN D DEFICIENCY: ICD-10-CM

## 2023-04-20 DIAGNOSIS — T69.1XXA CHILBLAINS, INITIAL ENCOUNTER: ICD-10-CM

## 2023-04-20 DIAGNOSIS — R70.0 ELEVATED SED RATE: ICD-10-CM

## 2023-04-20 DIAGNOSIS — M79.641 PAIN IN BOTH HANDS: ICD-10-CM

## 2023-04-20 DIAGNOSIS — E03.9 HYPOTHYROIDISM, UNSPECIFIED TYPE: Primary | ICD-10-CM

## 2023-04-20 DIAGNOSIS — M79.642 PAIN IN BOTH HANDS: ICD-10-CM

## 2023-04-20 DIAGNOSIS — T69.1XXA CHILBLAINS, INITIAL ENCOUNTER: Primary | ICD-10-CM

## 2023-04-20 LAB
C3 SERPL-MCNC: 117 MG/DL (ref 90–180)
C4 SERPL-MCNC: 23 MG/DL (ref 10–40)
CK SERPL-CCNC: 49 U/L (ref 20–180)
CRP SERPL HS-MCNC: <0.3 MG/DL (ref 0–0.4)

## 2023-04-20 PROCEDURE — G8427 DOCREV CUR MEDS BY ELIG CLIN: HCPCS | Performed by: INTERNAL MEDICINE

## 2023-04-20 PROCEDURE — 99204 OFFICE O/P NEW MOD 45 MIN: CPT | Performed by: INTERNAL MEDICINE

## 2023-04-20 PROCEDURE — G8417 CALC BMI ABV UP PARAM F/U: HCPCS | Performed by: INTERNAL MEDICINE

## 2023-04-20 PROCEDURE — 1036F TOBACCO NON-USER: CPT | Performed by: INTERNAL MEDICINE

## 2023-04-20 PROCEDURE — 99214 OFFICE O/P EST MOD 30 MIN: CPT | Performed by: INTERNAL MEDICINE

## 2023-04-20 RX ORDER — LEVOTHYROXINE SODIUM 88 MCG
88 TABLET ORAL DAILY
Qty: 30 TABLET | Refills: 11 | Status: SHIPPED | OUTPATIENT
Start: 2023-04-20

## 2023-04-20 RX ORDER — NIFEDIPINE 30 MG/1
30 TABLET, FILM COATED, EXTENDED RELEASE ORAL DAILY
Qty: 30 TABLET | Refills: 3 | Status: SHIPPED | OUTPATIENT
Start: 2023-04-20

## 2023-04-20 ASSESSMENT — ENCOUNTER SYMPTOMS
TROUBLE SWALLOWING: 1
DIARRHEA: 0
COUGH: 0
ABDOMINAL PAIN: 0
SHORTNESS OF BREATH: 1
NAUSEA: 0
VOMITING: 0
COLOR CHANGE: 0

## 2023-04-20 NOTE — PROGRESS NOTES
APPENDECTOMY  02/21/2017       SOCIAL HISTORY   Tobacco:   reports that she has never smoked. She has never used smokeless tobacco.  Alcohol:   reports no history of alcohol use. Drugs:   reports no history of drug use. FAMILY HISTORY   Family History   Problem Relation Age of Onset    Hypertension Father        ALLERGIES AND DRUG REACTIONS   No Known Allergies    CURRENT MEDICATIONS   Current Outpatient Medications   Medication Sig Dispense Refill    SYNTHROID 88 MCG tablet TAKE 1 TABLET BY MOUTH EVERY DAY 90 tablet 1     Current Facility-Administered Medications   Medication Dose Route Frequency Provider Last Rate Last Admin    Paragard Intrauterine Copper IUD 1 each  1 each IntraUTERine Once Pilar Roper MD   1 each at 10/25/21 0916       Review of Systems  Constitutional: No fever, no chills, no diaphoresis, no generalized weakness. HEENT: No blurred vision, No sore throat, no ear pain, no hair loss  Neck: denied any neck swelling, difficulty swallowing,   Cadrdiopulomary: No CP, SOB or palpitation, No orthopnea or PND. No cough or wheezing. GI: No N/V/D, no constipation, No abdominal pain, no melena or hematochezia   : Denied any dysuria, hematuria, flank pain, discharge, or incontinence. Skin: denied any rash, ulcer, Hirsute, or hyperpigmentation. MSK: denied any joint deformity, joint pain/swelling, muscle pain, or back pain.   Neuro: no numbess, no tingling, no weakness,     OBJECTIVE    /77   Pulse 66   Ht 4' 11\" (1.499 m)   Wt 138 lb (62.6 kg)   SpO2 100%   BMI 27.87 kg/m²   BP Readings from Last 4 Encounters:   04/20/23 116/77   03/23/23 110/68   01/06/23 122/68   12/14/22 112/74     Wt Readings from Last 6 Encounters:   04/20/23 138 lb (62.6 kg)   03/23/23 137 lb (62.1 kg)   01/06/23 133 lb (60.3 kg)   12/14/22 132 lb (59.9 kg)   12/12/22 137 lb (62.1 kg)   10/13/22 136 lb (61.7 kg)       Physical examination:  General: awake alert, oriented x3, no abnormal position or

## 2023-04-20 NOTE — PATIENT INSTRUCTIONS
I suspect you have Chilblain's but see no obvious signs of underlying systemic autoimmune disease    But will need further workup    Start Nifedipine one tab daily    Have Xrays of hands

## 2023-04-20 NOTE — PROGRESS NOTES
Acute Reflexive Panel; Future  -     Immunofixation serum profile; Future  -     Electrophoresis Protein, Serum; Future  -     Sedimentation Rate; Future  -     C-Reactive Protein; Future  -     CK; Future  -     C3 Complement; Future  -     C4 Complement; Future  -     Sjogren's Ab (SS-A, SS-B); Future  -     Anti-Scleroderma Antibody; Future  3. Pain in both hands  -     XR HAND LEFT (MIN 3 VIEWS); Future  -     XR HAND RIGHT (MIN 3 VIEWS); Future      Return in about 3 months (around 2023). Subjective   SUBJECTIVE/OBJECTIVE:    HPI: Tere Brar 1985 is a 42 y.o. female seen in consult for elevated sed rate and Raynaud's. Patient states she has had longstanding Raynaud's but for the last 6 weeks or so she has had more pain and diffuse swelling in the hands. She states that throughout the day the will go from dark purple to red, to white. She has similar manifestations in the feet. For the last 6 weeks she is also really had a lot of fatigue. She is also had a little bit of a stiff neck. She states she has a little trouble swallowing for the last year and a half. She is also been diagnosed with postpartum thyroiditis/Hashimoto's thyroiditis. She does have dry eyes and mouth. She will get some ulcers on the tongue but never on the cheeks of the roof of the mouth. She will get some random shortness of breath. She did have some epigastric pain around [place] which has since resolved. She had a normal ultrasound at that time. She had some blood work done about a month ago which showed negative LISA, negative rheumatoid factor and CCP, normal CRP, slightly elevated sed rate of 37, and low positive histone antibody.     Past Medical History:   Diagnosis Date    38 weeks gestation of pregnancy 2021     delivery delivered 2019    Dysuria 2020    Erythema pernio 2023    Hypothyroidism     Hypothyroidism due to Hashimoto's thyroiditis 2022    Personal

## 2023-04-21 LAB
ALBUMIN SERPL-MCNC: 3.6 G/DL (ref 3.5–4.7)
ALPHA1 GLOB SERPL ELPH-MCNC: 0.3 G/DL (ref 0.2–0.4)
ALPHA2 GLOB SERPL ELPH-MCNC: 0.9 G/DL (ref 0.5–1)
B-GLOBULIN SERPL ELPH-MCNC: 1.1 G/DL (ref 0.8–1.3)
ELECTROPHORESIS: NORMAL
ENA TO SSA (RO) ANTIBODY: NEGATIVE
ENA TO SSB (LA) ANTIBODY: NEGATIVE
ERYTHROCYTE [SEDIMENTATION RATE] IN BLOOD BY WESTERGREN METHOD: 10 MM/HR (ref 0–20)
GAMMA GLOB SERPL ELPH-MCNC: 1.4 G/DL (ref 0.7–1.6)
IMMUNOFIXATION RESULT, SERUM: NORMAL
PROT SERPL-MCNC: 7.3 G/DL (ref 6.4–8.3)
SCLERODERMA (SCL-70) AB: NEGATIVE

## 2023-04-24 LAB
B BURGDOR AB SER IA-ACNC: 0.44 LIV (ref 0–1.2)
Lab: NORMAL
REPORT: NORMAL
THIS TEST SENT TO: NORMAL

## 2023-04-25 LAB
Lab: NORMAL
REPORT: NORMAL
THIS TEST SENT TO: NORMAL

## 2023-04-29 DIAGNOSIS — E03.9 HYPOTHYROIDISM, UNSPECIFIED TYPE: ICD-10-CM

## 2023-05-02 RX ORDER — LEVOTHYROXINE SODIUM 88 MCG
TABLET ORAL
Qty: 90 TABLET | Refills: 3 | Status: SHIPPED | OUTPATIENT
Start: 2023-05-02

## 2023-05-22 RX ORDER — NIFEDIPINE 30 MG/1
TABLET, FILM COATED, EXTENDED RELEASE ORAL
Qty: 30 TABLET | Refills: 3 | Status: SHIPPED
Start: 2023-05-22 | End: 2023-05-25

## 2023-05-25 PROBLEM — O09.521 ADVANCED MATERNAL AGE IN MULTIGRAVIDA, FIRST TRIMESTER: Status: RESOLVED | Noted: 2021-01-18 | Resolved: 2023-05-25

## 2023-05-25 PROBLEM — O34.219 HISTORY OF CESAREAN DELIVERY, CURRENTLY PREGNANT: Status: RESOLVED | Noted: 2021-01-18 | Resolved: 2023-05-25

## 2023-05-31 RX ORDER — NIFEDIPINE 30 MG/1
TABLET, FILM COATED, EXTENDED RELEASE ORAL
Qty: 90 TABLET | Refills: 1 | Status: SHIPPED | OUTPATIENT
Start: 2023-05-31

## 2023-06-29 ENCOUNTER — HOSPITAL ENCOUNTER (OUTPATIENT)
Dept: GENERAL RADIOLOGY | Age: 38
Discharge: HOME | End: 2023-07-01
Payer: COMMERCIAL

## 2023-06-29 DIAGNOSIS — Z80.3 FAMILY HISTORY OF BREAST CANCER: ICD-10-CM

## 2023-06-29 PROCEDURE — 77063 BREAST TOMOSYNTHESIS BI: CPT

## 2023-07-26 ENCOUNTER — OFFICE VISIT (OUTPATIENT)
Dept: RHEUMATOLOGY | Age: 38
End: 2023-07-26
Payer: COMMERCIAL

## 2023-07-26 VITALS — HEIGHT: 59 IN | BODY MASS INDEX: 27.42 KG/M2 | WEIGHT: 136 LBS

## 2023-07-26 DIAGNOSIS — M13.0 POLYARTHRITIS: ICD-10-CM

## 2023-07-26 DIAGNOSIS — T69.1XXS CHILBLAINS, SEQUELA: Primary | ICD-10-CM

## 2023-07-26 DIAGNOSIS — T69.1XXS CHILBLAINS, SEQUELA: ICD-10-CM

## 2023-07-26 LAB
C-REACTIVE PROTEIN: <0.3 MG/L (ref 0–5)
RHEUMATOID FACTOR: <10 IU/ML (ref 0–13)

## 2023-07-26 PROCEDURE — 99214 OFFICE O/P EST MOD 30 MIN: CPT | Performed by: INTERNAL MEDICINE

## 2023-07-26 PROCEDURE — 1036F TOBACCO NON-USER: CPT | Performed by: INTERNAL MEDICINE

## 2023-07-26 PROCEDURE — G8417 CALC BMI ABV UP PARAM F/U: HCPCS | Performed by: INTERNAL MEDICINE

## 2023-07-26 PROCEDURE — G8427 DOCREV CUR MEDS BY ELIG CLIN: HCPCS | Performed by: INTERNAL MEDICINE

## 2023-07-26 ASSESSMENT — ENCOUNTER SYMPTOMS
COUGH: 0
ABDOMINAL PAIN: 0
NAUSEA: 0
DIARRHEA: 0
COLOR CHANGE: 0
VOMITING: 0

## 2023-07-26 NOTE — PROGRESS NOTES
Charlie Armando 1985 is a 42 y.o. female, here for evaluation of the following chief complaint(s):  Follow-up (Patient here for follow up on Chilbains. )         ASSESSMENT/PLAN:    Chalrie Armando 1985 is a 42 y.o. female seen in follow up for chilblains. 1.  Chilblains-symptoms have essentially resolved since last visit. She did not tolerate nifedipine. The timing of everything would suggest a reaction to spironolactone. She did have a positive histone antibody the spironolactone is not a medication with which we typically see drug-induced lupus, though can sometimes be associated with a vasculitis. Whether we call this drug-induced lupus or simply a drug reaction to spironolactone is semantics. Either way at this point work-up has otherwise been unrevealing and clinically I see no signs to suggest an underlying systemic connective tissue disease but will get further work-up as below to make sure there is nothing evolving. Otherwise would obviously avoid spironolactone going forward. If work-up below is unrevealing she can follow-up with me on a as needed basis. If I see anything concerning on work-up below I will have her back for follow-up as appropriate. 1. Chilblains, sequela  -     LISA; Future  -     C-Reactive Protein; Future  -     Sedimentation Rate; Future  -     Rheumatoid Factor; Future  -     Cyclic Citrul Peptide Antibody, IgG; Future  2. Polyarthritis  -     LISA; Future  -     C-Reactive Protein; Future  -     Sedimentation Rate; Future  -     Rheumatoid Factor; Future  -     Cyclic Citrul Peptide Antibody, IgG; Future      Return if symptoms worsen or fail to improve. Subjective   SUBJECTIVE/OBJECTIVE:    HPI: Charlie Armando 1985 is a 42 y.o. female seen in follow-up for chilblains. Last visit we did extensive serologic work-up which was unrevealing. Patient's symptoms have actually essentially resolved since last visit.   We did try her on

## 2023-07-27 LAB — SEDIMENTATION RATE, ERYTHROCYTE: 7 MM/HR (ref 0–20)

## 2023-07-28 LAB — ANTI-NUCLEAR ANTIBODY (ANA): NEGATIVE

## 2023-07-30 LAB — CCP IGG ANTIBODIES: 1 U/ML (ref 0–7)

## 2023-10-09 DIAGNOSIS — E03.9 HYPOTHYROIDISM, UNSPECIFIED TYPE: ICD-10-CM

## 2023-10-13 ENCOUNTER — TELEPHONE (OUTPATIENT)
Dept: ENDOCRINOLOGY | Age: 38
End: 2023-10-13

## 2023-10-13 NOTE — TELEPHONE ENCOUNTER
KIRIT: Spoke w/ Delicia Fermin from [address] [place] [address] lab, they stated they cant locate specimen for TSH and Free T4. The lab will need redrawn. Lab will outreach pt to inform her and have her come back in for redraw.

## 2023-10-16 ENCOUNTER — TELEPHONE (OUTPATIENT)
Dept: ENDOCRINOLOGY | Age: 38
End: 2023-10-16

## 2023-10-16 DIAGNOSIS — E03.9 HYPOTHYROIDISM, UNSPECIFIED TYPE: Primary | ICD-10-CM

## 2023-10-26 ENCOUNTER — TELEPHONE (OUTPATIENT)
Dept: ENDOCRINOLOGY | Age: 38
End: 2023-10-26

## 2023-10-26 NOTE — TELEPHONE ENCOUNTER
Pt lm on clinical line stating there has been a mix up with labwork, she needs labs reordered. Outreached pt regarding labs; no answer - lm stating there are future orders in her chart, we can fax them to her lab of choice or print them for her to  at the office. Asked pt to return call.

## 2023-11-30 ENCOUNTER — HOSPITAL ENCOUNTER (OUTPATIENT)
Age: 38
Discharge: HOME | End: 2023-11-30
Payer: COMMERCIAL

## 2023-11-30 DIAGNOSIS — E03.9 HYPOTHYROIDISM, UNSPECIFIED TYPE: ICD-10-CM

## 2023-11-30 LAB — T4 FREE SERPL-MCNC: 1.3 NG/DL (ref 0.9–1.7)

## 2023-11-30 PROCEDURE — 84439 ASSAY OF FREE THYROXINE: CPT

## 2023-11-30 PROCEDURE — 36415 COLL VENOUS BLD VENIPUNCTURE: CPT

## 2023-11-30 PROCEDURE — 84445 ASSAY OF TSI GLOBULIN: CPT

## 2023-12-01 ENCOUNTER — TELEPHONE (OUTPATIENT)
Dept: ENDOCRINOLOGY | Age: 38
End: 2023-12-01

## 2023-12-01 NOTE — TELEPHONE ENCOUNTER
Notify patient  Excellent thyroid hormones are very good.  Continue current dose of the thyroid medication

## 2023-12-03 LAB — THYROID STIMULATING IMMUNOGLOB: <0.1 IU/L

## 2023-12-14 ENCOUNTER — OFFICE VISIT (OUTPATIENT)
Dept: FAMILY MEDICINE CLINIC | Age: 38
End: 2023-12-14
Payer: COMMERCIAL

## 2023-12-14 VITALS
HEART RATE: 62 BPM | OXYGEN SATURATION: 98 % | HEIGHT: 59 IN | BODY MASS INDEX: 28.63 KG/M2 | RESPIRATION RATE: 18 BRPM | DIASTOLIC BLOOD PRESSURE: 60 MMHG | TEMPERATURE: 98.7 F | WEIGHT: 142 LBS | SYSTOLIC BLOOD PRESSURE: 102 MMHG

## 2023-12-14 DIAGNOSIS — E03.8 HYPOTHYROIDISM DUE TO HASHIMOTO'S THYROIDITIS: ICD-10-CM

## 2023-12-14 DIAGNOSIS — R53.82 CHRONIC FATIGUE: ICD-10-CM

## 2023-12-14 DIAGNOSIS — E06.3 HYPOTHYROIDISM DUE TO HASHIMOTO'S THYROIDITIS: ICD-10-CM

## 2023-12-14 DIAGNOSIS — Z00.00 ENCOUNTER FOR WELL ADULT EXAM WITHOUT ABNORMAL FINDINGS: Primary | ICD-10-CM

## 2023-12-14 PROCEDURE — 99395 PREV VISIT EST AGE 18-39: CPT | Performed by: STUDENT IN AN ORGANIZED HEALTH CARE EDUCATION/TRAINING PROGRAM

## 2023-12-14 PROCEDURE — G8484 FLU IMMUNIZE NO ADMIN: HCPCS | Performed by: STUDENT IN AN ORGANIZED HEALTH CARE EDUCATION/TRAINING PROGRAM

## 2023-12-14 NOTE — PATIENT INSTRUCTIONS
cured, salted, or canned meat, fish, or poultry (including weber, chipped beef, cold cuts, hot dogs, sausages, sardines, and anchovies) Poultry skins Breaded and/or fried fish or meats Canned peas, beans, and lentils Salted nuts   Fats and Oils   Olive oil and canola oil Low-sodium, low-fat salad dressings and mayonnaise   Butter, margarine, coconut and palm oils, weber fat   Snacks, Sweets, and Condiments   Low-sodium or unsalted versions of broths, soups, soy sauce, and condiments Pepper, herbs, and spices; vinegar, lemon, or lime juice Low-fat frozen desserts (yogurt, sherbet, fruit bars) Sugar, cocoa powder, honey, syrup, jam, and preserves Low-fat, trans-fat free cookies, cakes, and pies Chon and animal crackers, fig bars, valentina snaps   High-fat desserts Broth, soups, gravies, and sauces, made from instant mixes or other high-sodium ingredients Salted snack foods Canned olives Meat tenderizers, seasoning salt, and most flavored vinegars   Beverages   Low-sodium carbonated beverages Tea and coffee in moderation Soy milk   Commercially softened water   Suggestions   Make whole grains, fruits, and vegetables the base of your diet. Choose heart-healthy fats such as canola, olive, and flaxseed oil, and foods high in heart-healthy fats, such as nuts, seeds, soybeans, tofu, and fish. Eat fish at least twice per week; the fish highest in omega-3 fatty acids and lowest in mercury include salmon, herring, mackerel, sardines, and canned chunk light tuna. If you eat fish less than twice per week or have high triglycerides, talk to your doctor about taking fish oil supplements. Read food labels. For products low in fat and cholesterol, look for fat free, low-fat, cholesterol free, saturated fat free, and trans fat freeAlso scan the Nutrition Facts Label, which lists saturated fat, trans fat, and cholesterol amounts.    For products low in sodium, look for sodium free, very low sodium, low sodium, no added salt,

## 2023-12-14 NOTE — PROGRESS NOTES
Well Adult Note  Name: Jose De Jesus Penn Date: 2023   MRN: [id number] Sex: Female   Age: 42 y.o. Ethnicity: Non- / Non    : 1985 Race: White (non-)      Mauricio Emery is here for well adult exam.  History:  Annual exam:  Patient is here for routine yearly physical/preventative visit. Patient has no complaints or concerns today. Patient is  generally healthy. Chronic medical conditions are generally controlled. Most recent labs reviewed with patient and  are not remarkable. Health maintenance reviewed with patient and is  up to date. Overall doing well. She is still dealing with some of her issues with the joint pain and everything. She saw rheumatology but at this point does not want to go back. She does not want to do anything further at this time she will let me know if she does. Review of Systems   Constitutional:  Positive for fatigue. Negative for chills and fever. HENT:  Negative for congestion, ear pain, postnasal drip, rhinorrhea, sinus pressure, sinus pain and sore throat. Eyes:  Negative for pain and redness. Respiratory:  Negative for cough and shortness of breath. Cardiovascular:  Negative for chest pain. Gastrointestinal:  Negative for abdominal pain, constipation, diarrhea, nausea and vomiting. Genitourinary:  Negative for difficulty urinating and dysuria. Musculoskeletal:  Positive for arthralgias and joint swelling. Negative for back pain, myalgias and neck pain. Skin:  Negative for rash. Neurological:  Negative for dizziness, light-headedness and numbness. Psychiatric/Behavioral:  The patient is not nervous/anxious. Allergies   Allergen Reactions    Nifedipine      Dizzy, dropped blood sugar. Prior to Visit Medications    Medication Sig Taking?  Authorizing Provider   tretinoin (RETIN-A) 0.025 % cream APPLY 1 APPLICATION TOPICALLY ONCE PER DAY FOR 1 MONTH APPLY TO FACE EVERY NIGHT Yes Provider,

## 2023-12-15 ENCOUNTER — HOSPITAL ENCOUNTER (OUTPATIENT)
Dept: MRI IMAGING | Age: 38
Discharge: HOME | End: 2023-12-15
Payer: COMMERCIAL

## 2023-12-15 DIAGNOSIS — R92.333 HETEROGENEOUSLY DENSE TISSUE OF BOTH BREASTS ON MAMMOGRAPHY: ICD-10-CM

## 2023-12-15 DIAGNOSIS — Z91.89 AT HIGH RISK FOR BREAST CANCER: ICD-10-CM

## 2023-12-15 DIAGNOSIS — Z80.3 FAMILY HISTORY OF BREAST CANCER: ICD-10-CM

## 2023-12-15 PROCEDURE — C8908 MRI W/O FOL W/CONT, BREAST,: HCPCS

## 2023-12-15 PROCEDURE — A9585 GADOBUTROL INJECTION: HCPCS | Performed by: RADIOLOGY

## 2023-12-15 PROCEDURE — 6360000004 HC RX CONTRAST MEDICATION: Performed by: RADIOLOGY

## 2023-12-15 RX ORDER — GADOBUTROL 604.72 MG/ML
6.5 INJECTION INTRAVENOUS
Status: COMPLETED | OUTPATIENT
Start: 2023-12-15 | End: 2023-12-15

## 2023-12-15 RX ADMIN — GADOBUTROL 6.5 ML: 604.72 INJECTION INTRAVENOUS at 09:15

## 2024-04-18 ENCOUNTER — OFFICE VISIT (OUTPATIENT)
Dept: ENDOCRINOLOGY | Age: 39
End: 2024-04-18
Payer: COMMERCIAL

## 2024-04-18 VITALS
DIASTOLIC BLOOD PRESSURE: 69 MMHG | WEIGHT: 145 LBS | SYSTOLIC BLOOD PRESSURE: 111 MMHG | BODY MASS INDEX: 28.47 KG/M2 | HEART RATE: 71 BPM | OXYGEN SATURATION: 96 % | RESPIRATION RATE: 16 BRPM | HEIGHT: 60 IN

## 2024-04-18 DIAGNOSIS — E03.9 HYPOTHYROIDISM, UNSPECIFIED TYPE: Primary | ICD-10-CM

## 2024-04-18 DIAGNOSIS — E55.9 VITAMIN D DEFICIENCY: ICD-10-CM

## 2024-04-18 PROCEDURE — 1036F TOBACCO NON-USER: CPT | Performed by: INTERNAL MEDICINE

## 2024-04-18 PROCEDURE — G8427 DOCREV CUR MEDS BY ELIG CLIN: HCPCS | Performed by: INTERNAL MEDICINE

## 2024-04-18 PROCEDURE — 99214 OFFICE O/P EST MOD 30 MIN: CPT | Performed by: INTERNAL MEDICINE

## 2024-04-18 PROCEDURE — G8417 CALC BMI ABV UP PARAM F/U: HCPCS | Performed by: INTERNAL MEDICINE

## 2024-04-18 NOTE — PROGRESS NOTES
APPENDECTOMY  02/21/2017       SOCIAL HISTORY   Tobacco:   reports that she has never smoked. She has never used smokeless tobacco.  Alcohol:   reports no history of alcohol use.  Drugs:   reports no history of drug use.    FAMILY HISTORY   Family History   Problem Relation Age of Onset    Hypertension Father     Breast Cancer Paternal Grandmother 58    Bone Cancer Paternal Grandmother        ALLERGIES AND DRUG REACTIONS   Allergies   Allergen Reactions    Nifedipine      Dizzy, dropped blood sugar.        CURRENT MEDICATIONS   Current Outpatient Medications   Medication Sig Dispense Refill    tretinoin (RETIN-A) 0.025 % cream APPLY 1 APPLICATION TOPICALLY ONCE PER DAY FOR 1 MONTH APPLY TO FACE EVERY NIGHT      SYNTHROID 88 MCG tablet TAKE 1 TABLET BY MOUTH EVERY DAY 90 tablet 3     Current Facility-Administered Medications   Medication Dose Route Frequency Provider Last Rate Last Admin    Paragard Intrauterine Copper IUD 1 each  1 each IntraUTERine Once Belén Reyes MD   1 each at 10/25/21 0916       Review of Systems  Constitutional: No fever, no chills, no diaphoresis, no generalized weakness.  HEENT: No blurred vision, No sore throat, no ear pain, no hair loss  Neck: denied any neck swelling, difficulty swallowing,   Cadrdiopulomary: No CP, SOB or palpitation, No orthopnea or PND. No cough or wheezing.  GI: No N/V/D, no constipation, No abdominal pain, no melena or hematochezia   : Denied any dysuria, hematuria, flank pain, discharge, or incontinence.   Skin: denied any rash, ulcer, Hirsute, or hyperpigmentation.   MSK: denied any joint deformity, joint pain/swelling, muscle pain, or back pain.  Neuro: no numbess, no tingling, no weakness,     OBJECTIVE    /69   Pulse 71   Resp 16   Ht 1.524 m (5')   Wt 65.8 kg (145 lb)   SpO2 96%   BMI 28.32 kg/m²   BP Readings from Last 4 Encounters:   04/18/24 111/69   12/14/23 102/60   05/25/23 129/78   04/20/23 116/77     Wt Readings from Last 6

## 2024-04-19 DIAGNOSIS — E03.9 HYPOTHYROIDISM, UNSPECIFIED TYPE: ICD-10-CM

## 2024-04-19 LAB
T4 FREE: 1.2 NG/DL (ref 0.9–1.7)
TSH SERPL DL<=0.05 MIU/L-ACNC: 1.46 UIU/ML (ref 0.27–4.2)

## 2024-05-02 ENCOUNTER — TELEPHONE (OUTPATIENT)
Dept: ENDOCRINOLOGY | Age: 39
End: 2024-05-02

## 2024-05-03 NOTE — TELEPHONE ENCOUNTER
Notify patient  Excellent, thyroid hormones are very good.  Continue current dose of thyroid medication

## 2024-07-01 ENCOUNTER — HOSPITAL ENCOUNTER (OUTPATIENT)
Dept: GENERAL RADIOLOGY | Age: 39
Discharge: HOME | End: 2024-07-03
Payer: COMMERCIAL

## 2024-07-01 VITALS — BODY MASS INDEX: 28.22 KG/M2 | HEIGHT: 59 IN | WEIGHT: 140 LBS

## 2024-07-01 DIAGNOSIS — Z12.31 SCREENING MAMMOGRAM, ENCOUNTER FOR: ICD-10-CM

## 2024-07-01 PROCEDURE — 77063 BREAST TOMOSYNTHESIS BI: CPT

## 2024-07-23 DIAGNOSIS — E03.9 HYPOTHYROIDISM, UNSPECIFIED TYPE: ICD-10-CM

## 2024-07-23 RX ORDER — LEVOTHYROXINE SODIUM 88 UG/1
88 TABLET ORAL DAILY
Qty: 90 TABLET | Refills: 3 | Status: SHIPPED | OUTPATIENT
Start: 2024-07-23

## 2025-01-07 ASSESSMENT — PATIENT HEALTH QUESTIONNAIRE - PHQ9
1. LITTLE INTEREST OR PLEASURE IN DOING THINGS: NOT AT ALL
SUM OF ALL RESPONSES TO PHQ9 QUESTIONS 1 & 2: 0
2. FEELING DOWN, DEPRESSED OR HOPELESS: NOT AT ALL
SUM OF ALL RESPONSES TO PHQ QUESTIONS 1-9: 0
1. LITTLE INTEREST OR PLEASURE IN DOING THINGS: NOT AT ALL
SUM OF ALL RESPONSES TO PHQ9 QUESTIONS 1 & 2: 0
SUM OF ALL RESPONSES TO PHQ QUESTIONS 1-9: 0
2. FEELING DOWN, DEPRESSED OR HOPELESS: NOT AT ALL

## 2025-01-10 ENCOUNTER — OFFICE VISIT (OUTPATIENT)
Dept: FAMILY MEDICINE CLINIC | Age: 40
End: 2025-01-10

## 2025-01-10 VITALS
SYSTOLIC BLOOD PRESSURE: 112 MMHG | DIASTOLIC BLOOD PRESSURE: 70 MMHG | WEIGHT: 148 LBS | OXYGEN SATURATION: 99 % | HEART RATE: 74 BPM | BODY MASS INDEX: 29.06 KG/M2 | RESPIRATION RATE: 18 BRPM | TEMPERATURE: 97.1 F | HEIGHT: 60 IN

## 2025-01-10 DIAGNOSIS — E55.9 VITAMIN D DEFICIENCY: ICD-10-CM

## 2025-01-10 DIAGNOSIS — Z00.00 ENCOUNTER FOR WELL ADULT EXAM WITHOUT ABNORMAL FINDINGS: Primary | ICD-10-CM

## 2025-01-10 DIAGNOSIS — R73.9 HYPERGLYCEMIA: ICD-10-CM

## 2025-01-10 DIAGNOSIS — E06.3 HYPOTHYROIDISM DUE TO HASHIMOTO'S THYROIDITIS: ICD-10-CM

## 2025-01-10 DIAGNOSIS — R53.82 CHRONIC FATIGUE: ICD-10-CM

## 2025-01-10 DIAGNOSIS — R55 PRE-SYNCOPE: ICD-10-CM

## 2025-01-10 DIAGNOSIS — F41.9 ANXIETY: ICD-10-CM

## 2025-01-10 DIAGNOSIS — D50.9 IRON DEFICIENCY ANEMIA, UNSPECIFIED IRON DEFICIENCY ANEMIA TYPE: ICD-10-CM

## 2025-01-10 DIAGNOSIS — Z13.220 SCREENING CHOLESTEROL LEVEL: ICD-10-CM

## 2025-01-10 LAB
ALBUMIN: 4.4 G/DL (ref 3.5–5.2)
ALP BLD-CCNC: 57 U/L (ref 35–104)
ALT SERPL-CCNC: 8 U/L (ref 0–32)
ANION GAP SERPL CALCULATED.3IONS-SCNC: 18 MMOL/L (ref 7–16)
AST SERPL-CCNC: 13 U/L (ref 0–31)
BASOPHILS ABSOLUTE: 0.06 K/UL (ref 0–0.2)
BASOPHILS RELATIVE PERCENT: 1 % (ref 0–2)
BILIRUB SERPL-MCNC: 0.8 MG/DL (ref 0–1.2)
BUN BLDV-MCNC: 24 MG/DL (ref 6–20)
C-REACTIVE PROTEIN: <3 MG/L (ref 0–5)
CALCIUM SERPL-MCNC: 9.5 MG/DL (ref 8.6–10.2)
CHLORIDE BLD-SCNC: 105 MMOL/L (ref 98–107)
CHOLESTEROL, TOTAL: 175 MG/DL
CO2: 17 MMOL/L (ref 22–29)
CREAT SERPL-MCNC: 0.7 MG/DL (ref 0.5–1)
EOSINOPHILS ABSOLUTE: 0.24 K/UL (ref 0.05–0.5)
EOSINOPHILS RELATIVE PERCENT: 3 % (ref 0–6)
FERRITIN: 21 NG/ML
GFR, ESTIMATED: >90 ML/MIN/1.73M2
GLUCOSE BLD-MCNC: 90 MG/DL (ref 74–99)
HBA1C MFR BLD: 5.5 % (ref 4–5.6)
HCT VFR BLD CALC: 41.5 % (ref 34–48)
HDLC SERPL-MCNC: 62 MG/DL
HEMOGLOBIN: 13.3 G/DL (ref 11.5–15.5)
IMMATURE GRANULOCYTES %: 0 % (ref 0–5)
IMMATURE GRANULOCYTES ABSOLUTE: <0.03 K/UL (ref 0–0.58)
IRON % SATURATION: 17 % (ref 15–50)
IRON: 78 UG/DL (ref 37–145)
LDL CHOLESTEROL: 102 MG/DL
LYMPHOCYTES ABSOLUTE: 2.04 K/UL (ref 1.5–4)
LYMPHOCYTES RELATIVE PERCENT: 25 % (ref 20–42)
MAGNESIUM: 2 MG/DL (ref 1.6–2.6)
MCH RBC QN AUTO: 28.5 PG (ref 26–35)
MCHC RBC AUTO-ENTMCNC: 32 G/DL (ref 32–34.5)
MCV RBC AUTO: 88.9 FL (ref 80–99.9)
MONOCYTES ABSOLUTE: 0.46 K/UL (ref 0.1–0.95)
MONOCYTES RELATIVE PERCENT: 6 % (ref 2–12)
NEUTROPHILS ABSOLUTE: 5.44 K/UL (ref 1.8–7.3)
NEUTROPHILS RELATIVE PERCENT: 66 % (ref 43–80)
PDW BLD-RTO: 12.7 % (ref 11.5–15)
PLATELET # BLD: 381 K/UL (ref 130–450)
PMV BLD AUTO: 10.5 FL (ref 7–12)
POTASSIUM SERPL-SCNC: 4.6 MMOL/L (ref 3.5–5)
RBC # BLD: 4.67 M/UL (ref 3.5–5.5)
RHEUMATOID FACTOR: <10 IU/ML (ref 0–13)
SED RATE, AUTOMATED: 11 MM/HR (ref 0–20)
SODIUM BLD-SCNC: 140 MMOL/L (ref 132–146)
TOTAL IRON BINDING CAPACITY: 463 UG/DL (ref 250–450)
TOTAL PROTEIN: 7.8 G/DL (ref 6.4–8.3)
TRIGL SERPL-MCNC: 53 MG/DL
TSH SERPL DL<=0.05 MIU/L-ACNC: 1.13 UIU/ML (ref 0.27–4.2)
VITAMIN B-12: 815 PG/ML (ref 211–946)
VITAMIN D 25-HYDROXY: 47.9 NG/ML (ref 30–100)
VLDLC SERPL CALC-MCNC: 11 MG/DL
WBC # BLD: 8.3 K/UL (ref 4.5–11.5)

## 2025-01-10 SDOH — ECONOMIC STABILITY: FOOD INSECURITY: WITHIN THE PAST 12 MONTHS, YOU WORRIED THAT YOUR FOOD WOULD RUN OUT BEFORE YOU GOT MONEY TO BUY MORE.: NEVER TRUE

## 2025-01-10 SDOH — ECONOMIC STABILITY: FOOD INSECURITY: WITHIN THE PAST 12 MONTHS, THE FOOD YOU BOUGHT JUST DIDN'T LAST AND YOU DIDN'T HAVE MONEY TO GET MORE.: NEVER TRUE

## 2025-01-10 ASSESSMENT — ENCOUNTER SYMPTOMS
VOMITING: 0
RHINORRHEA: 0
EYE REDNESS: 0
SINUS PAIN: 0
COUGH: 0
TROUBLE SWALLOWING: 1
EYE PAIN: 0
ABDOMINAL PAIN: 0
BACK PAIN: 0
SORE THROAT: 0
CONSTIPATION: 0
SINUS PRESSURE: 0
SHORTNESS OF BREATH: 1
NAUSEA: 0
DIARRHEA: 0
CHEST TIGHTNESS: 1

## 2025-01-10 NOTE — PROGRESS NOTES
Well Adult Note  Name: Lea Pradhan Today’s Date: 1/10/2025   MRN: [id number] Sex: Female   Age: 39 y.o. Ethnicity: Non- / Non    : 1985 Race: White (non-)      Lea Pradhan is here for a well adult exam.       Assessment & Plan   Encounter for well adult exam without abnormal findings  Hypothyroidism due to Hashimoto's thyroiditis  -     TSH  -     Iron and TIBC  -     Ferritin  -     LISA  -     CBC with Auto Differential  -     C-Reactive Protein  -     Sedimentation Rate  -     Anti-DNA Antibody, Double-Stranded  -     Rheumatoid Factor  -     Lupus (LE) panel w/ reflex  Chronic fatigue  -     Vitamin B12  -     Magnesium  Iron deficiency anemia, unspecified iron deficiency anemia type  -     Comprehensive Metabolic Panel  Pre-syncope  Anxiety  Vitamin D deficiency  -     Vitamin D 25 Hydroxy  Hyperglycemia  -     Hemoglobin A1C  Screening cholesterol level  -     Lipid Panel      Return in 1 year (on 1/10/2026), or if symptoms worsen or fail to improve, for CPE (Physical Exam).       Subjective   History:  Annual exam:  Patient is here for routine yearly physical/preventative visit.    Patient is  generally healthy.  Chronic medical conditions are generally controlled.   Most recent labs reviewed with patient and  are not remarkable.  Health maintenance reviewed with patient and is  up to date. Overall doing well.       Patient states that she has been having a rash on her neck for at least 4 months now.  She states that she noticed around September.  Her children have a history of eczema so she has not changed anything like different soaps lotions or detergents.  They use very sensitive things for skin.  She has tried to use their lotions and it will help a little bit but then it does come right back.  She states that it is not really itchy.  She also has noticed that around the same time she has had issues with swallowing.  She states that she feels like she kind of forgets

## 2025-01-13 LAB
ANTI DNA DOUBLE STRANDED: NEGATIVE
ANTI-NUCLEAR ANTIBODY (ANA): NEGATIVE

## 2025-01-14 LAB
ANTINUCLEAR ANTIBODY, HEP-2, IGG: DETECTED
COMPLEMENT COMPONENT 3A: 140 MG/DL (ref 90–180)
COMPLEMENT COMPONENT 4: 22 MG/DL (ref 10–40)
RHEUMATOID FACTOR: <10 IU/ML (ref 0–14)

## 2025-01-15 DIAGNOSIS — R79.89 HIGH TOTAL IRON BINDING CAPACITY: Primary | ICD-10-CM

## 2025-01-15 DIAGNOSIS — R79.9 ELEVATED BUN: ICD-10-CM

## 2025-01-15 LAB
ANA INTERPRETATION: NORMAL
ANTINUCLEAR ANTIBODY, HEP-2, IGG: NORMAL

## 2025-02-17 ENCOUNTER — HOSPITAL ENCOUNTER (EMERGENCY)
Age: 40
Discharge: LEFT BEFORE DISCHARGE | End: 2025-02-17
Attending: STUDENT IN AN ORGANIZED HEALTH CARE EDUCATION/TRAINING PROGRAM
Payer: COMMERCIAL

## 2025-02-17 VITALS
OXYGEN SATURATION: 100 % | TEMPERATURE: 98.4 F | SYSTOLIC BLOOD PRESSURE: 127 MMHG | RESPIRATION RATE: 18 BRPM | HEART RATE: 74 BPM | DIASTOLIC BLOOD PRESSURE: 86 MMHG

## 2025-02-17 DIAGNOSIS — Z53.21 ELOPED FROM EMERGENCY DEPARTMENT: Primary | ICD-10-CM

## 2025-02-17 PROCEDURE — 99281 EMR DPT VST MAYX REQ PHY/QHP: CPT

## 2025-02-17 ASSESSMENT — PAIN - FUNCTIONAL ASSESSMENT: PAIN_FUNCTIONAL_ASSESSMENT: 0-10

## 2025-02-17 ASSESSMENT — PAIN SCALES - GENERAL: PAINLEVEL_OUTOF10: 5

## 2025-02-17 NOTE — ED TRIAGE NOTES
Department of Emergency Medicine  FIRST PROVIDER TRIAGE NOTE             Independent MLP           2/17/25  5:59 PM EST    Date of Encounter: 2/17/25   MRN: [id number]      HPI: Lea Pradhan is a 39 y.o. female who presents to the ED for Loss of Consciousness (Did not fall or hit head, currently having a miscarriage)       ROS: Negative for cp, Suicidal ideation, or Homicidal Ideation.    PE: Gen Appearance/Constitutional: alert  CV: regular rate     Initial Plan of Care: All treatment areas with department are currently occupied. Plan to order/Initiate the following while awaiting opening in ED: EKG.  Initiate Treatment-Testing, Proceed toTreatment Area When Bed Available for ED Attending/MLP to Continue Care    Electronically signed by TOBIAS Mao CNP   DD: 2/17/25

## 2025-02-18 NOTE — ED PROVIDER NOTES
Patient eloped from the emergency department.  No history of physical examination performed.  I never evaluated this patient.      Mayra eJter DO  02/17/25 [id number]

## 2025-04-22 ENCOUNTER — OFFICE VISIT (OUTPATIENT)
Dept: ENDOCRINOLOGY | Age: 40
End: 2025-04-22
Payer: COMMERCIAL

## 2025-04-22 VITALS
DIASTOLIC BLOOD PRESSURE: 82 MMHG | SYSTOLIC BLOOD PRESSURE: 120 MMHG | HEART RATE: 54 BPM | RESPIRATION RATE: 18 BRPM | BODY MASS INDEX: 28.66 KG/M2 | HEIGHT: 60 IN | OXYGEN SATURATION: 99 % | WEIGHT: 146 LBS | TEMPERATURE: 98.4 F

## 2025-04-22 DIAGNOSIS — E55.9 VITAMIN D DEFICIENCY: ICD-10-CM

## 2025-04-22 DIAGNOSIS — E03.9 HYPOTHYROIDISM, UNSPECIFIED TYPE: Primary | ICD-10-CM

## 2025-04-22 PROCEDURE — G8428 CUR MEDS NOT DOCUMENT: HCPCS | Performed by: INTERNAL MEDICINE

## 2025-04-22 PROCEDURE — 1036F TOBACCO NON-USER: CPT | Performed by: INTERNAL MEDICINE

## 2025-04-22 PROCEDURE — G2211 COMPLEX E/M VISIT ADD ON: HCPCS | Performed by: INTERNAL MEDICINE

## 2025-04-22 PROCEDURE — 99214 OFFICE O/P EST MOD 30 MIN: CPT | Performed by: INTERNAL MEDICINE

## 2025-04-22 PROCEDURE — G8419 CALC BMI OUT NRM PARAM NOF/U: HCPCS | Performed by: INTERNAL MEDICINE

## 2025-04-22 NOTE — PROGRESS NOTES
[facility] Department of Endocrinology Diabetes and Metabolism   [address] [place] [address]   Phone: [phone]  Fax: [phone]    Date of Service: 2025  Primary Care Physician: Tg Guardado DO  Referring physician: No ref. provider found  Provider: Justin Carrasquillo MD    Reason for the visit:  Primary hypothyroidism, vitamin D deficiency    History of Present Illness:  The history is provided by the patient. No  was used. Accuracy of the patient data is excellent.    Lea Pradhan is a very pleasant 39 y.o. female seen today for evaluation and management of Hashimoto's hypothyroidism    Lea Pradhan was initially diagnosed with hyperthyroidism in 2021 few months after delivery. At that time she was c/o intermittent palpitations, change in appetite, nervousness, anxiety, irritability, tremor, muscle weakness and difficulty sleeping.    Her thyroid level progressed to hypothyroidism likely from hashimoto's thyroiditis   Currently on levothyroxine 88 mcg daily     Lab Results   Component Value Date/Time    TSH 1.65 2025 10:01 AM    T4FREE 1.44 2025 10:01 AM    U9FQMBV 92.27 2022 12:09 PM    TSI <0.10 02/15/2022 09:34 AM    TPOABS 32.0 (H) 02/15/2022 09:33 AM     PAST MEDICAL HISTORY   Past Medical History:   Diagnosis Date    38 weeks gestation of pregnancy 2021     delivery delivered 2019    Dysuria 2020    Erythema pernio 2023    Hypothyroidism     Hypothyroidism due to Hashimoto's thyroiditis 2022    Personal history of COVID-19 2021       PAST SURGICAL HISTORY   Past Surgical History:   Procedure Laterality Date    APPENDECTOMY       SECTION N/A 2019     SECTION performed by Belén Reyes MD at [facility]     SECTION N/A 2021     SECTION performed by Belén Reyes MD at [facility] OR    LAPAROSCOPIC APPENDECTOMY

## 2025-05-23 DIAGNOSIS — E03.9 HYPOTHYROIDISM, UNSPECIFIED TYPE: ICD-10-CM

## 2025-05-23 LAB
T4 FREE: 1.3 NG/DL (ref 0.9–1.7)
TSH SERPL DL<=0.05 MIU/L-ACNC: 1.46 UIU/ML (ref 0.27–4.2)

## 2025-05-25 ENCOUNTER — RESULTS FOLLOW-UP (OUTPATIENT)
Dept: ENDOCRINOLOGY | Age: 40
End: 2025-05-25

## 2025-07-17 DIAGNOSIS — E03.9 HYPOTHYROIDISM, UNSPECIFIED TYPE: ICD-10-CM

## 2025-07-18 RX ORDER — LEVOTHYROXINE SODIUM 88 UG/1
88 TABLET ORAL DAILY
Qty: 90 TABLET | Refills: 3 | Status: SHIPPED | OUTPATIENT
Start: 2025-07-18

## (undated) DEVICE — GLOVE SURG SZ 65 L12IN FNGR THK83MIL CRM POLYISOPRENE

## (undated) DEVICE — GOWN,SIRUS,FABRNF,L,20/CS: Brand: MEDLINE

## (undated) DEVICE — COVER,LIGHT HANDLE,FLX,2/PK: Brand: MEDLINE INDUSTRIES, INC.

## (undated) DEVICE — SUTURE VCRL + SZ 0 L36IN ABSRB VLT L36MM CT-1 1/2 CIR VCP346H

## (undated) DEVICE — CONTAINER SPEC 64OZ POLYPR PATH SNAP LOK CAP W/ LID

## (undated) DEVICE — SUTURE STRATAFIX SPRL SZ 1 L14IN ABSRB VLT L48CM CTX 1/2 SXPD2B405

## (undated) DEVICE — 3000CC GUARDIAN II: Brand: GUARDIAN

## (undated) DEVICE — COUNTER NDL 30 COUNT DBL MAG

## (undated) DEVICE — TUBE BLD COLLECT ST 1 SIL COAT 7ML 10ML

## (undated) DEVICE — SHEET,DRAPE,53X77,STERILE: Brand: MEDLINE

## (undated) DEVICE — PEN: MARKING STD 100/CS: Brand: MEDICAL ACTION INDUSTRIES

## (undated) DEVICE — CATHETERIZATION KIT FOL16 FR 2000 CC DRAINAGE BG LUBRICATH

## (undated) DEVICE — PENCIL ES L3M BTTN SWCH HOLSTER W/ BLDE ELECTRD EDGE

## (undated) DEVICE — TOWEL,OR,DSP,ST,BLUE,STD,6/PK,12PK/CS: Brand: MEDLINE

## (undated) DEVICE — Z DISCONTINUED USE 2275676 GLOVE SURG SZ 65 L12IN FNGR THK87MIL DK GRN LTX FREE ISOLEX

## (undated) DEVICE — DRESSING FOAM POST OPERATIVE 4X10 IN MEPILEX BORDER AG

## (undated) DEVICE — SUTURE ABSORBABLE MONOFILAMENT 3-0 CT1 18 IN UD MONOCRYL + SXMP1B429

## (undated) DEVICE — SPONGE LAP W18XL18IN WHT COT 4 PLY FLD STRUNG RADPQ DISP ST

## (undated) DEVICE — STRIP,CLOSURE,WOUND,MEDI-STRIP,1/2X4: Brand: MEDLINE

## (undated) DEVICE — CESAREAN BIRTH PACK II: Brand: MEDLINE INDUSTRIES, INC.

## (undated) DEVICE — GOWN,SIRUS,POLYRNF,BRTHSLV,XLN/XL,20/CS: Brand: MEDLINE

## (undated) DEVICE — 3M™ STERI-STRIP™ COMPOUND BENZOIN TINCTURE 40 BAGS/CARTON 4 CARTONS/CASE C1544: Brand: 3M™ STERI-STRIP™

## (undated) DEVICE — HYPODERMIC SAFETY NEEDLE: Brand: MAGELLAN

## (undated) DEVICE — ELECTRODE PT RET AD L9FT HI MOIST COND ADH HYDRGEL CORDED

## (undated) DEVICE — SUTURE MNCRYL STRATAFIX PS 4-0 30CM

## (undated) DEVICE — CONTAINER,SPEC,PNEUM TUBE,3OZ,STRL PATH: Brand: MEDLINE

## (undated) DEVICE — BLADE SURG NO20 S STL STR DISP GLASSVAN

## (undated) DEVICE — TUBING, SUCTION, 3/16" X 12', STRAIGHT: Brand: MEDLINE

## (undated) DEVICE — Device: Brand: PORTEX

## (undated) DEVICE — MEDI-VAC YANKAUER SUCTION HANDLE W/BULBOUS TIP: Brand: CARDINAL HEALTH

## (undated) DEVICE — APPLICATOR PREP 26ML 0.7% IOD POVACRYLEX 74% ISO ALC ST